# Patient Record
Sex: FEMALE | Race: WHITE | NOT HISPANIC OR LATINO | URBAN - NONMETROPOLITAN AREA
[De-identification: names, ages, dates, MRNs, and addresses within clinical notes are randomized per-mention and may not be internally consistent; named-entity substitution may affect disease eponyms.]

---

## 2017-11-16 ENCOUNTER — APPOINTMENT (RX ONLY)
Dept: URBAN - NONMETROPOLITAN AREA CLINIC 2 | Facility: CLINIC | Age: 47
Setting detail: DERMATOLOGY
End: 2017-11-16

## 2017-11-16 DIAGNOSIS — L29.89 OTHER PRURITUS: ICD-10-CM

## 2017-11-16 DIAGNOSIS — L28.1 PRURIGO NODULARIS: ICD-10-CM

## 2017-11-16 DIAGNOSIS — D18.0 HEMANGIOMA: ICD-10-CM

## 2017-11-16 DIAGNOSIS — L259 CONTACT DERMATITIS AND OTHER ECZEMA, UNSPECIFIED CAUSE: ICD-10-CM

## 2017-11-16 DIAGNOSIS — L29.8 OTHER PRURITUS: ICD-10-CM

## 2017-11-16 DIAGNOSIS — Z87.891 PERSONAL HISTORY OF NICOTINE DEPENDENCE: ICD-10-CM

## 2017-11-16 PROBLEM — D18.01 HEMANGIOMA OF SKIN AND SUBCUTANEOUS TISSUE: Status: ACTIVE | Noted: 2017-11-16

## 2017-11-16 PROBLEM — F32.9 MAJOR DEPRESSIVE DISORDER, SINGLE EPISODE, UNSPECIFIED: Status: ACTIVE | Noted: 2017-11-16

## 2017-11-16 PROBLEM — L85.3 XEROSIS CUTIS: Status: ACTIVE | Noted: 2017-11-16

## 2017-11-16 PROBLEM — F41.9 ANXIETY DISORDER, UNSPECIFIED: Status: ACTIVE | Noted: 2017-11-16

## 2017-11-16 PROBLEM — L30.8 OTHER SPECIFIED DERMATITIS: Status: ACTIVE | Noted: 2017-11-16

## 2017-11-16 PROCEDURE — ? TREATMENT REGIMEN

## 2017-11-16 PROCEDURE — ? OBSERVATION

## 2017-11-16 PROCEDURE — ? OTHER

## 2017-11-16 PROCEDURE — ? COUNSELING

## 2017-11-16 PROCEDURE — 99203 OFFICE O/P NEW LOW 30 MIN: CPT

## 2017-11-16 ASSESSMENT — LOCATION DETAILED DESCRIPTION DERM
LOCATION DETAILED: RIGHT MEDIAL FRONTAL SCALP
LOCATION DETAILED: RIGHT VENTRAL PROXIMAL FOREARM
LOCATION DETAILED: RIGHT PROXIMAL PRETIBIAL REGION
LOCATION DETAILED: RIGHT CENTRAL FRONTAL SCALP
LOCATION DETAILED: 3RD WEB SPACE RIGHT HAND
LOCATION DETAILED: LEFT PROXIMAL DORSAL FOREARM
LOCATION DETAILED: RIGHT PROXIMAL DORSAL FOREARM
LOCATION DETAILED: SUPERIOR THORACIC SPINE
LOCATION DETAILED: LEFT ANTECUBITAL SKIN
LOCATION DETAILED: LEFT RADIAL DORSAL HAND
LOCATION DETAILED: LEFT DISTAL PRETIBIAL REGION
LOCATION DETAILED: LEFT VENTRAL PROXIMAL FOREARM
LOCATION DETAILED: RIGHT ANTERIOR DISTAL UPPER ARM

## 2017-11-16 ASSESSMENT — LOCATION ZONE DERM
LOCATION ZONE: SCALP
LOCATION ZONE: LEG
LOCATION ZONE: ARM
LOCATION ZONE: HAND
LOCATION ZONE: TRUNK

## 2017-11-16 ASSESSMENT — LOCATION SIMPLE DESCRIPTION DERM
LOCATION SIMPLE: RIGHT SCALP
LOCATION SIMPLE: SCALP
LOCATION SIMPLE: LEFT UPPER ARM
LOCATION SIMPLE: LEFT PRETIBIAL REGION
LOCATION SIMPLE: RIGHT UPPER ARM
LOCATION SIMPLE: LEFT HAND
LOCATION SIMPLE: RIGHT HAND
LOCATION SIMPLE: LEFT FOREARM
LOCATION SIMPLE: RIGHT PRETIBIAL REGION
LOCATION SIMPLE: UPPER BACK
LOCATION SIMPLE: RIGHT FOREARM

## 2017-11-16 ASSESSMENT — SEVERITY ASSESSMENT: SEVERITY: MILD TO MODERATE

## 2017-11-16 NOTE — PROCEDURE: OBSERVATION
X Size Of Lesion In Cm (Optional): 0
Detail Level: Simple
Body Location Override (Optional - Billing Will Still Be Based On Selected Body Map Location If Applicable): back
Detail Level: Zone
Body Location Override (Optional - Billing Will Still Be Based On Selected Body Map Location If Applicable): left Upper Extremity
Body Location Override (Optional - Billing Will Still Be Based On Selected Body Map Location If Applicable): right Upper Extremity
Body Location Override (Optional - Billing Will Still Be Based On Selected Body Map Location If Applicable): right Lower Leg
Body Location Override (Optional - Billing Will Still Be Based On Selected Body Map Location If Applicable): left lower leg
Body Location Override (Optional - Billing Will Still Be Based On Selected Body Map Location If Applicable): Scalp
Detail Level: Detailed
Body Location Override (Optional - Billing Will Still Be Based On Selected Body Map Location If Applicable): right arm
Body Location Override (Optional - Billing Will Still Be Based On Selected Body Map Location If Applicable): left arm

## 2017-11-16 NOTE — PROCEDURE: OTHER
Detail Level: Zone
Other (Free Text): Rash mild at today's visit. Discussed this is in the same category as eczema
Note Text (......Xxx Chief Complaint.): This diagnosis correlates with the
Other (Free Text): Patient has several stress factors that could play a big role in flares. \\nRecent car accident 3 days ago\\nBack pain \\nRecommended allergist for additional testing but could result in unclear results. Patient to d/c all antihistamines before seeing allergist\\n[facility], Dr Reynoso is Patients psychiatrist, discussed Doxepin. Patient will go over with Dr Reynoso about recommendation. \\nPatient is taking Depakote for bipolar, anxiety, depression and for sleep aid. Discussed this can cause itchy flares

## 2017-11-16 NOTE — PROCEDURE: TREATMENT REGIMEN
Otc Regimen: Sarna Lotion
Detail Level: Zone
Plan: Keeping skin hydrated throughout the day. \\nAfter showering pat dry and applying moisturizer while skin is still damp
Continue Regimen: Allegra QAM \\nBenadryl QHS

## 2020-10-07 ENCOUNTER — APPOINTMENT (RX ONLY)
Dept: URBAN - NONMETROPOLITAN AREA CLINIC 10 | Facility: CLINIC | Age: 50
Setting detail: DERMATOLOGY
End: 2020-10-07

## 2020-10-07 DIAGNOSIS — Q819 OTHER SPECIFIED ANOMALIES OF SKIN: ICD-10-CM

## 2020-10-07 DIAGNOSIS — Q828 OTHER SPECIFIED ANOMALIES OF SKIN: ICD-10-CM

## 2020-10-07 DIAGNOSIS — L20.89 OTHER ATOPIC DERMATITIS: ICD-10-CM

## 2020-10-07 DIAGNOSIS — Q826 OTHER SPECIFIED ANOMALIES OF SKIN: ICD-10-CM

## 2020-10-07 PROBLEM — L85.8 OTHER SPECIFIED EPIDERMAL THICKENING: Status: ACTIVE | Noted: 2020-10-07

## 2020-10-07 PROCEDURE — ? INTRAMUSCULAR KENALOG

## 2020-10-07 PROCEDURE — 96372 THER/PROPH/DIAG INJ SC/IM: CPT

## 2020-10-07 PROCEDURE — ? PRESCRIPTION

## 2020-10-07 PROCEDURE — ? COUNSELING

## 2020-10-07 PROCEDURE — 99214 OFFICE O/P EST MOD 30 MIN: CPT | Mod: 25

## 2020-10-07 PROCEDURE — ? TREATMENT REGIMEN

## 2020-10-07 RX ORDER — TRIAMCINOLONE ACETONIDE 1 MG/G
1 OINTMENT TOPICAL TWICE DAILY
Qty: 1 | Refills: 1 | Status: ERX | COMMUNITY
Start: 2020-10-07

## 2020-10-07 RX ADMIN — TRIAMCINOLONE ACETONIDE 1: 1 OINTMENT TOPICAL at 00:00

## 2020-10-07 ASSESSMENT — LOCATION SIMPLE DESCRIPTION DERM
LOCATION SIMPLE: LEFT POSTERIOR UPPER ARM
LOCATION SIMPLE: CHEST
LOCATION SIMPLE: RIGHT BUTTOCK
LOCATION SIMPLE: LEFT PRETIBIAL REGION
LOCATION SIMPLE: RIGHT PRETIBIAL REGION
LOCATION SIMPLE: LEFT UPPER BACK
LOCATION SIMPLE: LEFT UPPER ARM
LOCATION SIMPLE: RIGHT POSTERIOR UPPER ARM
LOCATION SIMPLE: RIGHT UPPER BACK
LOCATION SIMPLE: RIGHT UPPER ARM

## 2020-10-07 ASSESSMENT — LOCATION DETAILED DESCRIPTION DERM
LOCATION DETAILED: RIGHT PROXIMAL POSTERIOR UPPER ARM
LOCATION DETAILED: RIGHT BUTTOCK
LOCATION DETAILED: RIGHT ANTERIOR DISTAL UPPER ARM
LOCATION DETAILED: RIGHT MEDIAL SUPERIOR CHEST
LOCATION DETAILED: RIGHT INFERIOR UPPER BACK
LOCATION DETAILED: RIGHT PROXIMAL PRETIBIAL REGION
LOCATION DETAILED: LEFT SUPERIOR MEDIAL UPPER BACK
LOCATION DETAILED: LEFT PROXIMAL POSTERIOR UPPER ARM
LOCATION DETAILED: LEFT ANTERIOR PROXIMAL UPPER ARM
LOCATION DETAILED: LEFT PROXIMAL PRETIBIAL REGION

## 2020-10-07 ASSESSMENT — LOCATION ZONE DERM
LOCATION ZONE: LEG
LOCATION ZONE: TRUNK
LOCATION ZONE: ARM

## 2020-10-07 NOTE — PROCEDURE: INTRAMUSCULAR KENALOG
Detail Level: Detailed
Administered By (Optional): Consuelo
Consent: The risks of atrophy were reviewed with the patient.
Total Volume (Ccs): 1.5
Kenalog Preparation: kenalog
Lot # (Optional): [id number]
Concentration (Mg/Ml): 40.0
Expiration Date (Optional): 4/21
Add Option For Additional Mediation: No
Concentration (Mg/Ml) Of Additional Medication: 2.5

## 2020-11-04 ENCOUNTER — RX ONLY (OUTPATIENT)
Age: 50
Setting detail: RX ONLY
End: 2020-11-04

## 2020-11-04 RX ORDER — DUPILUMAB 300 MG/2ML
INJECTION, SOLUTION SUBCUTANEOUS
Qty: 2 | Refills: 0 | Status: ERX | COMMUNITY
Start: 2020-11-04

## 2020-11-09 ENCOUNTER — RX ONLY (OUTPATIENT)
Age: 50
Setting detail: RX ONLY
End: 2020-11-09

## 2020-11-09 RX ORDER — DUPILUMAB 300 MG/2ML
INJECTION, SOLUTION SUBCUTANEOUS
Qty: 2 | Refills: 0 | Status: ERX

## 2020-11-13 ENCOUNTER — RX ONLY (OUTPATIENT)
Age: 50
Setting detail: RX ONLY
End: 2020-11-13

## 2020-11-13 RX ORDER — DUPILUMAB 300 MG/2ML
INJECTION, SOLUTION SUBCUTANEOUS
Qty: 2 | Refills: 5 | Status: ERX

## 2020-11-13 RX ORDER — DUPILUMAB 300 MG/2ML
INJECTION, SOLUTION SUBCUTANEOUS
Qty: 2 | Refills: 0 | Status: ERX

## 2020-12-07 ENCOUNTER — APPOINTMENT (RX ONLY)
Dept: URBAN - NONMETROPOLITAN AREA CLINIC 2 | Facility: CLINIC | Age: 50
Setting detail: DERMATOLOGY
End: 2020-12-07

## 2020-12-07 DIAGNOSIS — L20.89 OTHER ATOPIC DERMATITIS: ICD-10-CM

## 2020-12-07 PROCEDURE — ? DUPIXENT INJECTION

## 2020-12-07 PROCEDURE — 96372 THER/PROPH/DIAG INJ SC/IM: CPT

## 2020-12-07 ASSESSMENT — LOCATION ZONE DERM
LOCATION ZONE: TRUNK
LOCATION ZONE: LEG

## 2020-12-07 ASSESSMENT — LOCATION SIMPLE DESCRIPTION DERM
LOCATION SIMPLE: RIGHT THIGH
LOCATION SIMPLE: ABDOMEN

## 2020-12-07 ASSESSMENT — LOCATION DETAILED DESCRIPTION DERM
LOCATION DETAILED: PERIUMBILICAL SKIN
LOCATION DETAILED: RIGHT ANTERIOR PROXIMAL THIGH

## 2020-12-07 NOTE — PROCEDURE: DUPIXENT INJECTION
Detail Level: None
Dupixent Dosing: 300 mg
Syringe Size Used (Required For Enhanced Ndc): 300 mg/2ml prefilled syringe
Lot # (Optional): [id number]
Expiration Date (Optional): 2/2022
Administered By (Optional): Lo ALVAREZ
Consent: The risks of pain and injection site reactions were reviewed with the patient prior to the injection.
Include J-Code In Bill: No
J-Code: 
Use Enhanced Ndc?: Yes
Ndc (300 Mg Prefilled Syringe): [address]-27
Ndc (200 Mg Prefilled Syringe): [address]-80
Expiration Date (Optional): 01/2023
Lot # (Optional): [id number]

## 2020-12-09 ENCOUNTER — APPOINTMENT (RX ONLY)
Dept: URBAN - NONMETROPOLITAN AREA CLINIC 2 | Facility: CLINIC | Age: 50
Setting detail: DERMATOLOGY
End: 2020-12-09

## 2020-12-09 DIAGNOSIS — B00.1 HERPESVIRAL VESICULAR DERMATITIS: ICD-10-CM

## 2020-12-09 DIAGNOSIS — L20.89 OTHER ATOPIC DERMATITIS: ICD-10-CM

## 2020-12-09 PROCEDURE — 99213 OFFICE O/P EST LOW 20 MIN: CPT

## 2020-12-09 PROCEDURE — ? PRESCRIPTION

## 2020-12-09 PROCEDURE — ? TREATMENT REGIMEN

## 2020-12-09 PROCEDURE — ? COUNSELING

## 2020-12-09 RX ORDER — TRIAMCINOLONE ACETONIDE 1 MG/G
1 OINTMENT TOPICAL BID
Qty: 1 | Refills: 1 | Status: ERX

## 2020-12-09 RX ORDER — VALACYCLOVIR 1 G/1
2 TABLET, FILM COATED ORAL Q12 HOURS
Qty: 12 | Refills: 1 | Status: ERX | COMMUNITY
Start: 2020-12-09

## 2020-12-09 RX ORDER — HYDROXYZINE HYDROCHLORIDE 10 MG/1
1-3 TABLET, FILM COATED ORAL QHS
Qty: 90 | Refills: 1 | Status: ERX | COMMUNITY
Start: 2020-12-09

## 2020-12-09 RX ORDER — DUPILUMAB 300 MG/2ML
1 INJECTION, SOLUTION SUBCUTANEOUS
Qty: 1 | Refills: 2 | Status: ERX

## 2020-12-09 RX ADMIN — VALACYCLOVIR 2: 1 TABLET, FILM COATED ORAL at 00:00

## 2020-12-09 RX ADMIN — HYDROXYZINE HYDROCHLORIDE 1-3: 10 TABLET, FILM COATED ORAL at 00:00

## 2020-12-09 ASSESSMENT — LOCATION DETAILED DESCRIPTION DERM
LOCATION DETAILED: RIGHT ANTERIOR PROXIMAL THIGH
LOCATION DETAILED: RIGHT DISTAL POSTERIOR THIGH
LOCATION DETAILED: UPPER STERNUM
LOCATION DETAILED: RIGHT INFERIOR VERMILION LIP
LOCATION DETAILED: LEFT SUPERIOR VERMILION LIP
LOCATION DETAILED: RIGHT VENTRAL PROXIMAL FOREARM
LOCATION DETAILED: LEFT ANTERIOR PROXIMAL UPPER ARM
LOCATION DETAILED: RIGHT PROXIMAL POSTERIOR UPPER ARM
LOCATION DETAILED: LEFT VENTRAL PROXIMAL FOREARM
LOCATION DETAILED: LEFT ANTERIOR PROXIMAL THIGH
LOCATION DETAILED: SUPERIOR THORACIC SPINE
LOCATION DETAILED: LEFT PROXIMAL DORSAL FOREARM
LOCATION DETAILED: RIGHT ANTERIOR PROXIMAL UPPER ARM
LOCATION DETAILED: LEFT DISTAL POSTERIOR THIGH
LOCATION DETAILED: LEFT PROXIMAL POSTERIOR UPPER ARM
LOCATION DETAILED: PERIUMBILICAL SKIN
LOCATION DETAILED: RIGHT PROXIMAL DORSAL FOREARM

## 2020-12-09 ASSESSMENT — LOCATION SIMPLE DESCRIPTION DERM
LOCATION SIMPLE: ABDOMEN
LOCATION SIMPLE: RIGHT POSTERIOR UPPER ARM
LOCATION SIMPLE: LEFT POSTERIOR THIGH
LOCATION SIMPLE: LEFT LIP
LOCATION SIMPLE: LEFT UPPER ARM
LOCATION SIMPLE: LEFT THIGH
LOCATION SIMPLE: LEFT POSTERIOR UPPER ARM
LOCATION SIMPLE: RIGHT POSTERIOR THIGH
LOCATION SIMPLE: CHEST
LOCATION SIMPLE: UPPER BACK
LOCATION SIMPLE: RIGHT UPPER ARM
LOCATION SIMPLE: LEFT FOREARM
LOCATION SIMPLE: RIGHT FOREARM
LOCATION SIMPLE: RIGHT THIGH
LOCATION SIMPLE: RIGHT LIP

## 2020-12-09 ASSESSMENT — LOCATION ZONE DERM
LOCATION ZONE: ARM
LOCATION ZONE: TRUNK
LOCATION ZONE: LEG
LOCATION ZONE: LIP

## 2020-12-09 NOTE — PROCEDURE: COUNSELING
Patient Specific Counseling (Will Not Stick From Patient To Patient): Use hydroxyzine in place of trazadone at bedtime if possible to avoid enhanced sedation effects
Detail Level: Zone

## 2020-12-09 NOTE — PROCEDURE: TREATMENT REGIMEN
Initiate Treatment: Hydroxyzine 10mg (1-3) QHS
Samples Given: CeraVe Moisturizing Cream
Plan: Discussed with patient returning to clinic for our office to administer injections going forward if patient does not want to self inject. Patient had injection on hand and will self inject on 12/21. \\nRecommended cooling showers down to help reduce further irritation from hot water, apply a moisturizing cream (CeraVe) within 60 seconds of drying off to prevent dryness
Detail Level: Zone
Continue Regimen: Dupixent 300mg Q2 weeks\\nTriamcinolone 0.1% Ointment applying to affected areas on the body twice daily for up to two weeks then as needed when flare ups occur

## 2020-12-31 ENCOUNTER — APPOINTMENT (OUTPATIENT)
Dept: CT IMAGING | Age: 50
DRG: 558 | End: 2020-12-31
Payer: COMMERCIAL

## 2020-12-31 ENCOUNTER — APPOINTMENT (OUTPATIENT)
Dept: GENERAL RADIOLOGY | Age: 50
DRG: 558 | End: 2020-12-31
Payer: COMMERCIAL

## 2020-12-31 ENCOUNTER — APPOINTMENT (OUTPATIENT)
Dept: ULTRASOUND IMAGING | Age: 50
DRG: 558 | End: 2020-12-31
Payer: COMMERCIAL

## 2020-12-31 ENCOUNTER — HOSPITAL ENCOUNTER (INPATIENT)
Age: 50
LOS: 8 days | Discharge: NURSING, REHAB OR LONG-TERM CARE | DRG: 558 | End: 2021-01-08
Attending: EMERGENCY MEDICINE | Admitting: INTERNAL MEDICINE
Payer: COMMERCIAL

## 2020-12-31 DIAGNOSIS — M62.82 NON-TRAUMATIC RHABDOMYOLYSIS: Primary | ICD-10-CM

## 2020-12-31 LAB
AMPHETAMINE SCREEN, URINE: NOT DETECTED
ANION GAP SERPL CALCULATED.3IONS-SCNC: 9 MMOL/L (ref 7–16)
BARBITURATE SCREEN URINE: NOT DETECTED
BASOPHILS ABSOLUTE: 0.08 E9/L (ref 0–0.2)
BASOPHILS RELATIVE PERCENT: 0.6 % (ref 0–2)
BENZODIAZEPINE SCREEN, URINE: NOT DETECTED
BUN BLDV-MCNC: 16 MG/DL (ref 6–20)
CALCIUM SERPL-MCNC: 8.7 MG/DL (ref 8.6–10.2)
CANNABINOID SCREEN URINE: NOT DETECTED
CHLORIDE BLD-SCNC: 104 MMOL/L (ref 98–107)
CO2: 22 MMOL/L (ref 22–29)
COCAINE METABOLITE SCREEN URINE: NOT DETECTED
CREAT SERPL-MCNC: 1 MG/DL (ref 0.5–1)
D DIMER: 308 NG/ML DDU
EOSINOPHILS ABSOLUTE: 0.37 E9/L (ref 0.05–0.5)
EOSINOPHILS RELATIVE PERCENT: 2.6 % (ref 0–6)
FENTANYL SCREEN, URINE: NOT DETECTED
GFR AFRICAN AMERICAN: >60
GFR NON-AFRICAN AMERICAN: 58 ML/MIN/1.73
GLUCOSE BLD-MCNC: 128 MG/DL (ref 74–99)
HBA1C MFR BLD: 5.7 % (ref 4–5.6)
HCT VFR BLD CALC: 48.3 % (ref 34–48)
HEMOGLOBIN: 16.7 G/DL (ref 11.5–15.5)
IMMATURE GRANULOCYTES #: 0.09 E9/L
IMMATURE GRANULOCYTES %: 0.6 % (ref 0–5)
LYMPHOCYTES ABSOLUTE: 2.32 E9/L (ref 1.5–4)
LYMPHOCYTES RELATIVE PERCENT: 16.6 % (ref 20–42)
Lab: NORMAL
MAGNESIUM: 2.2 MG/DL (ref 1.6–2.6)
MCH RBC QN AUTO: 30.1 PG (ref 26–35)
MCHC RBC AUTO-ENTMCNC: 34.6 % (ref 32–34.5)
MCV RBC AUTO: 87 FL (ref 80–99.9)
METHADONE SCREEN, URINE: NOT DETECTED
MONOCYTES ABSOLUTE: 1.19 E9/L (ref 0.1–0.95)
MONOCYTES RELATIVE PERCENT: 8.5 % (ref 2–12)
NEUTROPHILS ABSOLUTE: 9.96 E9/L (ref 1.8–7.3)
NEUTROPHILS RELATIVE PERCENT: 71.1 % (ref 43–80)
OPIATE SCREEN URINE: NOT DETECTED
OXYCODONE URINE: NOT DETECTED
PDW BLD-RTO: 13.5 FL (ref 11.5–15)
PHENCYCLIDINE SCREEN URINE: NOT DETECTED
PLATELET # BLD: 219 E9/L (ref 130–450)
PMV BLD AUTO: 11.2 FL (ref 7–12)
POTASSIUM SERPL-SCNC: 3.5 MMOL/L (ref 3.5–5)
PRO-BNP: 11 PG/ML (ref 0–125)
RBC # BLD: 5.55 E12/L (ref 3.5–5.5)
SODIUM BLD-SCNC: 135 MMOL/L (ref 132–146)
TOTAL CK: 1406 U/L (ref 20–180)
TROPONIN: <0.01 NG/ML (ref 0–0.03)
WBC # BLD: 14 E9/L (ref 4.5–11.5)

## 2020-12-31 PROCEDURE — 2580000003 HC RX 258: Performed by: EMERGENCY MEDICINE

## 2020-12-31 PROCEDURE — 94640 AIRWAY INHALATION TREATMENT: CPT

## 2020-12-31 PROCEDURE — 83036 HEMOGLOBIN GLYCOSYLATED A1C: CPT

## 2020-12-31 PROCEDURE — 6360000002 HC RX W HCPCS: Performed by: NURSE PRACTITIONER

## 2020-12-31 PROCEDURE — 84484 ASSAY OF TROPONIN QUANT: CPT

## 2020-12-31 PROCEDURE — 1200000000 HC SEMI PRIVATE

## 2020-12-31 PROCEDURE — 93970 EXTREMITY STUDY: CPT

## 2020-12-31 PROCEDURE — 85025 COMPLETE CBC W/AUTO DIFF WBC: CPT

## 2020-12-31 PROCEDURE — 83735 ASSAY OF MAGNESIUM: CPT

## 2020-12-31 PROCEDURE — 96374 THER/PROPH/DIAG INJ IV PUSH: CPT

## 2020-12-31 PROCEDURE — 6360000004 HC RX CONTRAST MEDICATION: Performed by: RADIOLOGY

## 2020-12-31 PROCEDURE — 99222 1ST HOSP IP/OBS MODERATE 55: CPT | Performed by: INTERNAL MEDICINE

## 2020-12-31 PROCEDURE — 6370000000 HC RX 637 (ALT 250 FOR IP): Performed by: NURSE PRACTITIONER

## 2020-12-31 PROCEDURE — 82550 ASSAY OF CK (CPK): CPT

## 2020-12-31 PROCEDURE — 6370000000 HC RX 637 (ALT 250 FOR IP): Performed by: EMERGENCY MEDICINE

## 2020-12-31 PROCEDURE — 97161 PT EVAL LOW COMPLEX 20 MIN: CPT

## 2020-12-31 PROCEDURE — 71275 CT ANGIOGRAPHY CHEST: CPT

## 2020-12-31 PROCEDURE — 99285 EMERGENCY DEPT VISIT HI MDM: CPT

## 2020-12-31 PROCEDURE — 6360000002 HC RX W HCPCS: Performed by: STUDENT IN AN ORGANIZED HEALTH CARE EDUCATION/TRAINING PROGRAM

## 2020-12-31 PROCEDURE — 93005 ELECTROCARDIOGRAM TRACING: CPT | Performed by: NURSE PRACTITIONER

## 2020-12-31 PROCEDURE — 80048 BASIC METABOLIC PNL TOTAL CA: CPT

## 2020-12-31 PROCEDURE — 94664 DEMO&/EVAL PT USE INHALER: CPT

## 2020-12-31 PROCEDURE — 80307 DRUG TEST PRSMV CHEM ANLYZR: CPT

## 2020-12-31 PROCEDURE — 71045 X-RAY EXAM CHEST 1 VIEW: CPT

## 2020-12-31 PROCEDURE — 83880 ASSAY OF NATRIURETIC PEPTIDE: CPT

## 2020-12-31 PROCEDURE — 85378 FIBRIN DEGRADE SEMIQUANT: CPT

## 2020-12-31 PROCEDURE — 2580000003 HC RX 258: Performed by: NURSE PRACTITIONER

## 2020-12-31 RX ORDER — BUDESONIDE 0.5 MG/2ML
0.5 INHALANT ORAL 2 TIMES DAILY
Status: DISCONTINUED | OUTPATIENT
Start: 2020-12-31 | End: 2020-12-31

## 2020-12-31 RX ORDER — ACETAMINOPHEN 500 MG
1000 TABLET ORAL ONCE
Status: DISCONTINUED | OUTPATIENT
Start: 2020-12-31 | End: 2020-12-31

## 2020-12-31 RX ORDER — HYDROXYZINE HYDROCHLORIDE 10 MG/1
10 TABLET, FILM COATED ORAL NIGHTLY PRN
COMMUNITY

## 2020-12-31 RX ORDER — BUDESONIDE 0.5 MG/2ML
0.5 INHALANT ORAL 2 TIMES DAILY
Status: DISCONTINUED | OUTPATIENT
Start: 2020-12-31 | End: 2021-01-08 | Stop reason: HOSPADM

## 2020-12-31 RX ORDER — SODIUM CHLORIDE 0.9 % (FLUSH) 0.9 %
10 SYRINGE (ML) INJECTION PRN
Status: DISCONTINUED | OUTPATIENT
Start: 2020-12-31 | End: 2021-01-08 | Stop reason: HOSPADM

## 2020-12-31 RX ORDER — SODIUM CHLORIDE AND POTASSIUM CHLORIDE .9; .15 G/100ML; G/100ML
SOLUTION INTRAVENOUS CONTINUOUS
Status: DISCONTINUED | OUTPATIENT
Start: 2020-12-31 | End: 2021-01-07

## 2020-12-31 RX ORDER — TRAZODONE HYDROCHLORIDE 50 MG/1
50 TABLET ORAL NIGHTLY
COMMUNITY

## 2020-12-31 RX ORDER — ESCITALOPRAM OXALATE 20 MG/1
20 TABLET ORAL DAILY
Status: ON HOLD | COMMUNITY
End: 2020-12-31

## 2020-12-31 RX ORDER — ONDANSETRON 2 MG/ML
4 INJECTION INTRAMUSCULAR; INTRAVENOUS ONCE
Status: COMPLETED | OUTPATIENT
Start: 2020-12-31 | End: 2020-12-31

## 2020-12-31 RX ORDER — BUDESONIDE AND FORMOTEROL FUMARATE DIHYDRATE 160; 4.5 UG/1; UG/1
2 AEROSOL RESPIRATORY (INHALATION) 2 TIMES DAILY
COMMUNITY

## 2020-12-31 RX ORDER — ALBUTEROL SULFATE 0.63 MG/3ML
1 SOLUTION RESPIRATORY (INHALATION) EVERY 6 HOURS PRN
COMMUNITY

## 2020-12-31 RX ORDER — ACETAMINOPHEN 325 MG/1
650 TABLET ORAL EVERY 6 HOURS PRN
Status: DISCONTINUED | OUTPATIENT
Start: 2020-12-31 | End: 2021-01-08 | Stop reason: HOSPADM

## 2020-12-31 RX ORDER — PANTOPRAZOLE SODIUM 40 MG/1
40 TABLET, DELAYED RELEASE ORAL
Status: DISCONTINUED | OUTPATIENT
Start: 2021-01-01 | End: 2021-01-08 | Stop reason: HOSPADM

## 2020-12-31 RX ORDER — TRAMADOL HYDROCHLORIDE 50 MG/1
25 TABLET ORAL EVERY 6 HOURS PRN
Status: DISCONTINUED | OUTPATIENT
Start: 2020-12-31 | End: 2020-12-31

## 2020-12-31 RX ORDER — OLANZAPINE 5 MG/1
5 TABLET ORAL NIGHTLY
Status: ON HOLD | COMMUNITY
End: 2021-01-08 | Stop reason: HOSPADM

## 2020-12-31 RX ORDER — ALBUTEROL SULFATE 0.63 MG/3ML
1 SOLUTION RESPIRATORY (INHALATION) EVERY 6 HOURS PRN
Status: DISCONTINUED | OUTPATIENT
Start: 2020-12-31 | End: 2021-01-08 | Stop reason: HOSPADM

## 2020-12-31 RX ORDER — SODIUM CHLORIDE 9 MG/ML
INJECTION, SOLUTION INTRAVENOUS CONTINUOUS
Status: DISCONTINUED | OUTPATIENT
Start: 2020-12-31 | End: 2020-12-31

## 2020-12-31 RX ORDER — ARFORMOTEROL TARTRATE 15 UG/2ML
15 SOLUTION RESPIRATORY (INHALATION) 2 TIMES DAILY
Status: DISCONTINUED | OUTPATIENT
Start: 2020-12-31 | End: 2020-12-31

## 2020-12-31 RX ORDER — ACETAMINOPHEN 650 MG/1
650 SUPPOSITORY RECTAL EVERY 6 HOURS PRN
Status: DISCONTINUED | OUTPATIENT
Start: 2020-12-31 | End: 2021-01-08 | Stop reason: HOSPADM

## 2020-12-31 RX ORDER — SODIUM CHLORIDE 0.9 % (FLUSH) 0.9 %
10 SYRINGE (ML) INJECTION EVERY 12 HOURS SCHEDULED
Status: DISCONTINUED | OUTPATIENT
Start: 2020-12-31 | End: 2021-01-08 | Stop reason: HOSPADM

## 2020-12-31 RX ORDER — OXYCODONE HYDROCHLORIDE AND ACETAMINOPHEN 5; 325 MG/1; MG/1
1 TABLET ORAL EVERY 6 HOURS PRN
Status: DISCONTINUED | OUTPATIENT
Start: 2020-12-31 | End: 2021-01-01

## 2020-12-31 RX ORDER — POLYETHYLENE GLYCOL 3350 17 G/17G
17 POWDER, FOR SOLUTION ORAL DAILY PRN
Status: DISCONTINUED | OUTPATIENT
Start: 2020-12-31 | End: 2021-01-08 | Stop reason: HOSPADM

## 2020-12-31 RX ORDER — BUDESONIDE AND FORMOTEROL FUMARATE DIHYDRATE 160; 4.5 UG/1; UG/1
2 AEROSOL RESPIRATORY (INHALATION) 2 TIMES DAILY
Status: DISCONTINUED | OUTPATIENT
Start: 2020-12-31 | End: 2020-12-31 | Stop reason: CLARIF

## 2020-12-31 RX ORDER — ARFORMOTEROL TARTRATE 15 UG/2ML
15 SOLUTION RESPIRATORY (INHALATION) 2 TIMES DAILY
Status: DISCONTINUED | OUTPATIENT
Start: 2020-12-31 | End: 2021-01-08 | Stop reason: HOSPADM

## 2020-12-31 RX ORDER — LORAZEPAM 2 MG/ML
1 INJECTION INTRAMUSCULAR ONCE
Status: COMPLETED | OUTPATIENT
Start: 2020-12-31 | End: 2020-12-31

## 2020-12-31 RX ORDER — BUDESONIDE AND FORMOTEROL FUMARATE DIHYDRATE 160; 4.5 UG/1; UG/1
2 AEROSOL RESPIRATORY (INHALATION) 2 TIMES DAILY
Status: DISCONTINUED | OUTPATIENT
Start: 2020-12-31 | End: 2020-12-31 | Stop reason: ALTCHOICE

## 2020-12-31 RX ORDER — ESCITALOPRAM OXALATE 10 MG/1
20 TABLET ORAL DAILY
Status: DISCONTINUED | OUTPATIENT
Start: 2020-12-31 | End: 2020-12-31

## 2020-12-31 RX ORDER — MORPHINE SULFATE 2 MG/ML
2 INJECTION, SOLUTION INTRAMUSCULAR; INTRAVENOUS
Status: DISCONTINUED | OUTPATIENT
Start: 2020-12-31 | End: 2021-01-08 | Stop reason: HOSPADM

## 2020-12-31 RX ORDER — BUSPIRONE HYDROCHLORIDE 10 MG/1
10 TABLET ORAL DAILY PRN
COMMUNITY

## 2020-12-31 RX ORDER — PROMETHAZINE HYDROCHLORIDE 25 MG/1
12.5 TABLET ORAL EVERY 6 HOURS PRN
Status: DISCONTINUED | OUTPATIENT
Start: 2020-12-31 | End: 2021-01-08 | Stop reason: HOSPADM

## 2020-12-31 RX ORDER — ACETAMINOPHEN 500 MG
1000 TABLET ORAL ONCE
Status: COMPLETED | OUTPATIENT
Start: 2020-12-31 | End: 2020-12-31

## 2020-12-31 RX ORDER — ONDANSETRON 2 MG/ML
4 INJECTION INTRAMUSCULAR; INTRAVENOUS EVERY 6 HOURS PRN
Status: DISCONTINUED | OUTPATIENT
Start: 2020-12-31 | End: 2021-01-08 | Stop reason: HOSPADM

## 2020-12-31 RX ORDER — 0.9 % SODIUM CHLORIDE 0.9 %
500 INTRAVENOUS SOLUTION INTRAVENOUS ONCE
Status: COMPLETED | OUTPATIENT
Start: 2020-12-31 | End: 2020-12-31

## 2020-12-31 RX ADMIN — ACETAMINOPHEN 1000 MG: 500 TABLET ORAL at 08:01

## 2020-12-31 RX ADMIN — TRAMADOL HYDROCHLORIDE 25 MG: 50 TABLET, FILM COATED ORAL at 15:40

## 2020-12-31 RX ADMIN — POTASSIUM CHLORIDE AND SODIUM CHLORIDE: 900; 150 INJECTION, SOLUTION INTRAVENOUS at 20:50

## 2020-12-31 RX ADMIN — ONDANSETRON 4 MG: 2 INJECTION INTRAMUSCULAR; INTRAVENOUS at 14:01

## 2020-12-31 RX ADMIN — SODIUM CHLORIDE, PRESERVATIVE FREE 10 ML: 5 INJECTION INTRAVENOUS at 21:42

## 2020-12-31 RX ADMIN — LORAZEPAM 1 MG: 2 INJECTION INTRAMUSCULAR; INTRAVENOUS at 16:01

## 2020-12-31 RX ADMIN — MORPHINE SULFATE 2 MG: 2 INJECTION, SOLUTION INTRAMUSCULAR; INTRAVENOUS at 17:26

## 2020-12-31 RX ADMIN — MORPHINE SULFATE 2 MG: 2 INJECTION, SOLUTION INTRAMUSCULAR; INTRAVENOUS at 21:34

## 2020-12-31 RX ADMIN — ENOXAPARIN SODIUM 40 MG: 40 INJECTION SUBCUTANEOUS at 14:01

## 2020-12-31 RX ADMIN — IOPAMIDOL 75 ML: 755 INJECTION, SOLUTION INTRAVENOUS at 16:20

## 2020-12-31 RX ADMIN — ONDANSETRON 4 MG: 2 INJECTION INTRAMUSCULAR; INTRAVENOUS at 06:21

## 2020-12-31 RX ADMIN — POTASSIUM CHLORIDE AND SODIUM CHLORIDE: 900; 150 INJECTION, SOLUTION INTRAVENOUS at 12:49

## 2020-12-31 RX ADMIN — MORPHINE SULFATE 2 MG: 2 INJECTION, SOLUTION INTRAMUSCULAR; INTRAVENOUS at 13:56

## 2020-12-31 RX ADMIN — OXYCODONE AND ACETAMINOPHEN 1 TABLET: 5; 325 TABLET ORAL at 20:19

## 2020-12-31 RX ADMIN — BUDESONIDE 500 MCG: 0.5 SUSPENSION RESPIRATORY (INHALATION) at 19:29

## 2020-12-31 RX ADMIN — ARFORMOTEROL TARTRATE 15 MCG: 15 SOLUTION RESPIRATORY (INHALATION) at 19:28

## 2020-12-31 RX ADMIN — SODIUM CHLORIDE 500 ML: 9 INJECTION, SOLUTION INTRAVENOUS at 07:23

## 2020-12-31 ASSESSMENT — PAIN SCALES - GENERAL
PAINLEVEL_OUTOF10: 9
PAINLEVEL_OUTOF10: 6
PAINLEVEL_OUTOF10: 10
PAINLEVEL_OUTOF10: 9

## 2020-12-31 NOTE — ED NOTES
Pt states \"unable to walk to restroom attached to room due to pain\".  Pt placed in wheelchair to go to restroom     Kim Roberts RN  12/31/20 9880

## 2020-12-31 NOTE — ED PROVIDER NOTES
Department of Emergency Medicine   ED  Provider Note  Admit Date/RoomTime: 12/31/2020  5:20 AM  ED Room: 9048/8018-R          History of Present Illness:  12/31/20, Time: 7:20 AM EST  Chief Complaint   Patient presents with    Other     PT c/o sharp pain in BLE, calfs x 2 days. pt states bed ridden x 5 days w/ N/V/D x 3 days                Destiney Roche is a 48 y.o. female presenting to the ED for bilateral calf pain, beginning 2 days ago. The complaint has been constant, moderate in severity, and worsened by nothing. Patient reports bilateral calf pain for the last 2 days. She reports that last week she had nausea, vomiting, diarrhea for a few days. She reports that she laid around in bed secondary to this illness. She reports she noticed swelling to both lower legs. She reports her lower legs hurt. There was no trauma. No fevers or chills. No chest pain or shortness of breath. She says her nausea vomiting diarrhea all resolved and has been several days since she had any of the symptoms. She denies abdominal pain. No history of DVT or PE. She denies any other complaints. She also reports that almost 2 weeks ago she started taking Zyprexa for mental health. Review of Systems:   A complete review of systems was performed and pertinent positives and negatives are stated within HPI, all other systems reviewed and are negative.        --------------------------------------------- PAST HISTORY ---------------------------------------------  Past Medical History: She reports mental health history but denies other medical history    Past Surgical History: She reports cholecystectomy    Social History:      Family History: family history is not on file. . Unless otherwise noted, family history is non contributory    The patients home medications have been reviewed.     Allergies: Latex, Ibuprofen, Naproxen, and Vicodin [hydrocodone-acetaminophen]    I have reviewed the past medical history, past surgical history, social history, and family history    ---------------------------------------------------PHYSICAL EXAM--------------------------------------    Constitutional/General: Alert and oriented x3  Head: Normocephalic and atraumatic  Eyes: PERRL, EOMI, sclera non icteric  Mouth: Oropharynx clear, handling secretions  Neck: Supple, full ROM, no stridor, no meningeal signs  Respiratory: Lungs clear to auscultation bilaterally, no wheezes, rales, or rhonchi. Not in respiratory distress  Cardiovascular:  Regular rate. Regular rhythm. No murmurs, no aortic murmurs, no gallops, no rubs. 2+ distal pulses. Equal extremity pulses. Gastrointestinal:  Abdomen Soft, Non tender, Non distended. No rebound, guarding, or rigidity. No pulsatile masses. Musculoskeletal: Moves all extremities x 4. Warm and well perfused, no clubbing, no cyanosis, no edema. She has bilateral calf tenderness, no palpable cords. Both lower legs are swollen and tender to the touch. Compartments are soft however her legs are swollen from the knees down. Neurovascularly intact distally. 2+ DP/PT pulses. Capillary refill <3 secondary. Warm and well perfused. Sural, saphenous, deep peroneal, peroneal, and tibial nerves intact. Sensation intact. 5/5 strength. Achilies tendon intact. No evidence of compartment syndrome. Capillary refill <3 seconds  Skin: skin warm and dry. No rashes. Neurologic: GCS 15, no focal deficits   Psychiatric: Normal Affect          -------------------------------------------------- RESULTS -------------------------------------------------  I have personally reviewed all laboratory and imaging results for this patient. Results are listed below.      LABS: (Lab results interpreted by me)  Results for orders placed or performed during the hospital encounter of 12/31/20   CBC Auto Differential   Result Value Ref Range    WBC 14.0 (H) 4.5 - 11.5 E9/L    RBC 5.55 (H) 3.50 - 5.50 E12/L    Hemoglobin 16.7 (H) 11.5 - 15.5 g/dL Hematocrit 48.3 (H) 34.0 - 48.0 %    MCV 87.0 80.0 - 99.9 fL    MCH 30.1 26.0 - 35.0 pg    MCHC 34.6 (H) 32.0 - 34.5 %    RDW 13.5 11.5 - 15.0 fL    Platelets 367 579 - 521 E9/L    MPV 11.2 7.0 - 12.0 fL    Neutrophils % 71.1 43.0 - 80.0 %    Immature Granulocytes % 0.6 0.0 - 5.0 %    Lymphocytes % 16.6 (L) 20.0 - 42.0 %    Monocytes % 8.5 2.0 - 12.0 %    Eosinophils % 2.6 0.0 - 6.0 %    Basophils % 0.6 0.0 - 2.0 %    Neutrophils Absolute 9.96 (H) 1.80 - 7.30 E9/L    Immature Granulocytes # 0.09 E9/L    Lymphocytes Absolute 2.32 1.50 - 4.00 E9/L    Monocytes Absolute 1.19 (H) 0.10 - 0.95 E9/L    Eosinophils Absolute 0.37 0.05 - 0.50 E9/L    Basophils Absolute 0.08 0.00 - 0.20 P1/J   Basic metabolic panel   Result Value Ref Range    Sodium 135 132 - 146 mmol/L    Potassium 3.5 3.5 - 5.0 mmol/L    Chloride 104 98 - 107 mmol/L    CO2 22 22 - 29 mmol/L    Anion Gap 9 7 - 16 mmol/L    Glucose 128 (H) 74 - 99 mg/dL    BUN 16 6 - 20 mg/dL    CREATININE 1.0 0.5 - 1.0 mg/dL    GFR Non-African American 58 >=60 mL/min/1.73    GFR African American >60     Calcium 8.7 8.6 - 10.2 mg/dL   Troponin   Result Value Ref Range    Troponin <0.01 0.00 - 0.03 ng/mL   Magnesium   Result Value Ref Range    Magnesium 2.2 1.6 - 2.6 mg/dL   Brain Natriuretic Peptide   Result Value Ref Range    Pro-BNP 11 0 - 125 pg/mL   CK   Result Value Ref Range    Total CK 1,406 (H) 20 - 180 U/L   D-Dimer, Quantitative   Result Value Ref Range    D-Dimer, Quant 308 ng/mL DDU   ,       RADIOLOGY:  Interpreted by Radiologist unless otherwise specified  US DUP LOWER EXTREMITIES BILATERAL VENOUS   Final Result   No evidence of DVT in either lower extremity.       XR CHEST PORTABLE   Final Result   No acute cardiopulmonary disease.             ------------------------- NURSING NOTES AND VITALS REVIEWED ---------------------------   The nursing notes within the ED encounter and vital signs as below have been reviewed by myself  /89   Pulse 100   Temp 96 °F (35.6 °C) (Oral)   Resp 16   Ht 5' 11\" (1.803 m)   Wt 240 lb (108.9 kg)   SpO2 97%   BMI 33.47 kg/m²     Oxygen Saturation Interpretation: Normal    The patients available past medical records and past encounters were reviewed. ------------------------------ ED COURSE/MEDICAL DECISION MAKING----------------------  Medications   ondansetron (ZOFRAN) injection 4 mg (4 mg Intravenous Given 12/31/20 0621)   0.9 % sodium chloride bolus (0 mLs Intravenous Stopped 12/31/20 0826)   acetaminophen (TYLENOL) tablet 1,000 mg (1,000 mg Oral Given 12/31/20 0801)            I, Dr. Lena Elam, am the primary provider of record    Medical Decision Making:   Rhabdomyolysis, could be related to Zyprexa as this is a adverse effect of this medication. IV fluids were given. No signs of compartment syndrome. No signs of DVT. I spoke with medicine for admission to the hospital.    Oxygen Saturation Interpretation: 97 % on room air. Normal      Re-Evaluations:       No change      This patient's ED course included: a personal history and physicial examination, re-evaluation prior to disposition, IV medications and complex medical decision making and emergency management    This patient has remained hemodynamically stable during their ED course. Consultations:  [employer]    Counseling: The emergency provider has spoken with the patient and discussed todays results, in addition to providing specific details for the plan of care and counseling regarding the diagnosis and prognosis. Questions are answered at this time and they are agreeable with the plan.       --------------------------------- IMPRESSION AND DISPOSITION ---------------------------------    IMPRESSION  1. Non-traumatic rhabdomyolysis        DISPOSITION  Disposition: Admit to med/surg floor  Patient condition is stable        NOTE: This report was transcribed using voice recognition software.  Every effort was made to ensure accuracy; however, inadvertent computerized transcription errors may be present     Mady Cartagena MD  12/31/20 0510

## 2020-12-31 NOTE — PROGRESS NOTES
Physical Therapy    Facility/Department: [facility] SURGERY  Initial Assessment    NAME: Dragan Kuhn  : 1970  MRN: [id number]    Date of Service: 2020      Patient Diagnosis(es): The encounter diagnosis was Non-traumatic rhabdomyolysis. Referring Provider: VALENTIN Miguel     Evaluating PT:  Shadi Francis, PT, DPT [id number]    Room #:  8692/5031-X  Diagnosis:  Non-traumatic rhabdomyolysis  Precautions:  Fall risk  Equipment Needs:  w/w    SUBJECTIVE:    Pt lives in [place] in a bi-level home with 5 steps to enter with 1 rail. Pt ambulated with no device PTA. OBJECTIVE:   Initial Evaluation  Date:  Treatment Short Term/ Long Term   Goals   Was pt agreeable to Eval/treatment? yes     Does pt have pain? Severe pain in bilateral LE with left being greater then right. Bed Mobility  Rolling: independent  Supine to sit: independent  Sit to supine: NT  Scooting: independent  independent   Transfers Sit to stand: supervision  Stand to sit: supervision  Stand pivot: NT  independent   Ambulation   Pt unable due to pain. 150+ feet with AAD of needed modified independent    Stair negotiation: ascended and descended  NT  4 steps with 1 rail modified independent   ROM BLE:  WFL     Strength BLE:  Grossly 4/5     Balance Sitting EOB:  Independent  Static standing with w/w SBA  Sitting EOB:  independent  Dynamic Standing:  Modified independent   AM-PAC 6 Clicks 49/01       Pt is A & O x 3  Sensation:  Pt reported neuropathy in bilateral LEs      Patient education  Pt educated on PT objectives during eval and while in the hospital, hand placement during transfers with use of walker, use of walker to off load painful LE    Patient response to education:   Pt verbalized understanding Pt demonstrated skill Pt requires further education in this area   Yes  yes yes     ASSESSMENT:    Comments:  Pt found in bed.   Pt reported a lot of pain bilateral LE and reported nursing had just given her pain medication. Educated pt about use of walker to assist with off loading painful LE. Cueing required for hand placement during transfers. Once standing, pt unable to put left LE flat on floor when standing. Pt's standing tolerance less then one minute. Pt performed sit to stand twice during eval.  At end of eval, pt left sitting on the EOB with call light in reach. Pt's/ family goals   1. None stated    Patient and or family understand(s) diagnosis, prognosis, and plan of care. yes    PLAN OF CARE:    Current Treatment Recommendations     [] Strengthening     [] ROM   [x] Balance Training   [] Endurance Training   [x] Transfer Training   [x] Gait Training   [x] Stair Training   [] Positioning   [x] Safety and Education Training   [x] Patient/Caregiver Education   [] HEP  [] Other     Frequency of treatments: 2-5x/week x 1-2 weeks. Time in  1426  Time out  1435    Evaluation Time includes thorough review of current medical information, gathering information on past medical history/social history and prior level of function, completion of standardized testing/informal observation of tasks, assessment of data and education on plan of care and goals.     CPT codes:  [x] Low Complexity PT evaluation 46863  [] Moderate Complexity PT evaluation 36771  [] High Complexity PT evaluation 36693  [] PT Re-evaluation 09033  [] Gait training 71809 _ minutes  [] Therapeutic activities 55172 _ minutes  [] Therapeutic exercises 27049 _ minutes      Saima Lucas PT, DPT  PT [id number]

## 2020-12-31 NOTE — H&P
[facility] History and Physical      CHIEF COMPLAINT:  Severe pain in lower legs    History of Present Illness:     Patient is a 47 yo female patient with a past medical history of asthma, IBS, anxiety/depression/ PTSD/bipolar. She follows with PCP and juan miguel in [place]. Patient presented to the ER with complaints of severe pain in lower legs. Per patient she resides in [place] and is in town visiting family for the holiday. She drove in from [place] on 12/22. Reporting over [place] she started not feeling well with \" upset stomach, N/V. Reporting appetite was decreased at that time. She last threw up on the 29th. However, 2 days she started having sharp, stabbing pain in bilateral lower legs. Pain rated 10/10. Took tylenol at home with no relief. Per pt due to pain she was unable to walk or put pressure on feet. Symptoms severe, moderate, and constant. Nothing makes better or worse. She follows with juan miguel and per pt has anxiety/ depression/ PTSD. Reporting she has been on multiple medications in the past and has had side effects. She was started on zyprexa 1 month ago. Reporting she has allergy to NSAIDs and vicodin. Also reporting she needs nausea medication with any pain medication. Patient awake, alert, resting calmly in bed in no acute distress. Reporting pain 10/10. Reporting appetite  Good last 2-3 days. Denies fevers, sob, chest pain, dysuria, hematuria, hematochezia. Denies ill contacts. Patient reporting she does not know names or dosages of medications. Informant(s) for H&P:patient. Chart review     REVIEW OF SYSTEMS:  A comprehensive review of systems was negative except for: what is in the HPI      PMH:  No past medical history on file. Surgical History:  No past surgical history on file.     Medications Prior to Admission:    Prior to Admission medications    Not on File       Allergies:    Latex, Ibuprofen, Naproxen, and Vicodin [hydrocodone-acetaminophen]    Social History:        Family History:   family history is not on file. Does not know paternal family history  Mom: h/o cancer runs in famiy   Celiac diease       PHYSICAL EXAM:  Vitals:  /75   Pulse 84   Temp 98.2 °F (36.8 °C) (Oral)   Resp 16   SpO2 96%   General Appearance: alert and oriented to person, place and time and in no acute distress  Skin: warm and dry  Head: normocephalic and atraumatic  Neck: neck supple and non tender without mass   Pulmonary/Chest: diminished throughout to auscultation   Cardiovascular: normal rate, normal S1 and S2 and no carotid bruits  Abdomen: soft, non-tender, non-distended, normal bowel sounds  Extremities: no cyanosis, no clubbing, lower ext edema 1-2 + pitting   Neurologic: speech normal         LABS:  Recent Labs     12/31/20  0610      K 3.5      CO2 22   BUN 16   CREATININE 1.0   GLUCOSE 128*   CALCIUM 8.7       Recent Labs     12/31/20  0610   WBC 14.0*   RBC 5.55*   HGB 16.7*   HCT 48.3*   MCV 87.0   MCH 30.1   MCHC 34.6*   RDW 13.5      MPV 11.2       No results for input(s): POCGLU in the last 72 hours. Radiology:   US DUP LOWER EXTREMITIES BILATERAL VENOUS   Final Result   No evidence of DVT in either lower extremity. XR CHEST PORTABLE   Final Result   No acute cardiopulmonary disease. ASSESSMENT:      Active Problems:    Rhabdomyolysis  Resolved Problems:    * No resolved hospital problems. *      PLAN:    1. Acute rhabdomyolysis- possibly related to zyprexa. Pt reporting she felt ill over [place] with N/V/ \" upset stomach,\" decreased appetite. Soon after started having sharp, stabbing pain in bilateral legs with associated edema. CK total 1400 on presentation. Rehydrated in ER. Hold zyprexa. IVF. Lab monitoring. Pain management. 2. Bilateral lower extremity edema: likely associated to above. US negative for dvt. D dimer pending. 3. N/V: resolving.      4. Anxiety/ depression/ PTSD/ bipolar: reporting she follows

## 2021-01-01 LAB
ANION GAP SERPL CALCULATED.3IONS-SCNC: 11 MMOL/L (ref 7–16)
BUN BLDV-MCNC: 12 MG/DL (ref 6–20)
CALCIUM SERPL-MCNC: 8.6 MG/DL (ref 8.6–10.2)
CHLORIDE BLD-SCNC: 99 MMOL/L (ref 98–107)
CO2: 24 MMOL/L (ref 22–29)
CREAT SERPL-MCNC: 0.9 MG/DL (ref 0.5–1)
GFR AFRICAN AMERICAN: >60
GFR NON-AFRICAN AMERICAN: >60 ML/MIN/1.73
GLUCOSE BLD-MCNC: 127 MG/DL (ref 74–99)
HCT VFR BLD CALC: 44.6 % (ref 34–48)
HEMOGLOBIN: 14.6 G/DL (ref 11.5–15.5)
MCH RBC QN AUTO: 29.5 PG (ref 26–35)
MCHC RBC AUTO-ENTMCNC: 32.7 % (ref 32–34.5)
MCV RBC AUTO: 90.1 FL (ref 80–99.9)
PDW BLD-RTO: 13.5 FL (ref 11.5–15)
PLATELET # BLD: 221 E9/L (ref 130–450)
PMV BLD AUTO: 10.6 FL (ref 7–12)
POTASSIUM REFLEX MAGNESIUM: 3.8 MMOL/L (ref 3.5–5)
RBC # BLD: 4.95 E12/L (ref 3.5–5.5)
SODIUM BLD-SCNC: 134 MMOL/L (ref 132–146)
TOTAL CK: 7630 U/L (ref 20–180)
WBC # BLD: 15.1 E9/L (ref 4.5–11.5)

## 2021-01-01 PROCEDURE — 6360000002 HC RX W HCPCS: Performed by: NURSE PRACTITIONER

## 2021-01-01 PROCEDURE — 36415 COLL VENOUS BLD VENIPUNCTURE: CPT

## 2021-01-01 PROCEDURE — 6370000000 HC RX 637 (ALT 250 FOR IP): Performed by: NURSE PRACTITIONER

## 2021-01-01 PROCEDURE — 85027 COMPLETE CBC AUTOMATED: CPT

## 2021-01-01 PROCEDURE — 80048 BASIC METABOLIC PNL TOTAL CA: CPT

## 2021-01-01 PROCEDURE — 94640 AIRWAY INHALATION TREATMENT: CPT

## 2021-01-01 PROCEDURE — 1200000000 HC SEMI PRIVATE

## 2021-01-01 PROCEDURE — 82550 ASSAY OF CK (CPK): CPT

## 2021-01-01 PROCEDURE — 99232 SBSQ HOSP IP/OBS MODERATE 35: CPT | Performed by: INTERNAL MEDICINE

## 2021-01-01 PROCEDURE — 6370000000 HC RX 637 (ALT 250 FOR IP): Performed by: INTERNAL MEDICINE

## 2021-01-01 RX ORDER — OXYCODONE HYDROCHLORIDE AND ACETAMINOPHEN 5; 325 MG/1; MG/1
1 TABLET ORAL EVERY 4 HOURS PRN
Status: DISCONTINUED | OUTPATIENT
Start: 2021-01-01 | End: 2021-01-08 | Stop reason: HOSPADM

## 2021-01-01 RX ADMIN — OXYCODONE AND ACETAMINOPHEN 1 TABLET: 5; 325 TABLET ORAL at 02:19

## 2021-01-01 RX ADMIN — POTASSIUM CHLORIDE AND SODIUM CHLORIDE: 900; 150 INJECTION, SOLUTION INTRAVENOUS at 08:46

## 2021-01-01 RX ADMIN — POTASSIUM CHLORIDE AND SODIUM CHLORIDE: 900; 150 INJECTION, SOLUTION INTRAVENOUS at 15:24

## 2021-01-01 RX ADMIN — BUDESONIDE 500 MCG: 0.5 SUSPENSION RESPIRATORY (INHALATION) at 19:48

## 2021-01-01 RX ADMIN — OXYCODONE AND ACETAMINOPHEN 1 TABLET: 5; 325 TABLET ORAL at 12:45

## 2021-01-01 RX ADMIN — PROMETHAZINE HYDROCHLORIDE 12.5 MG: 25 TABLET ORAL at 03:21

## 2021-01-01 RX ADMIN — OXYCODONE AND ACETAMINOPHEN 1 TABLET: 5; 325 TABLET ORAL at 21:41

## 2021-01-01 RX ADMIN — ACETAMINOPHEN 650 MG: 325 TABLET ORAL at 00:06

## 2021-01-01 RX ADMIN — ARFORMOTEROL TARTRATE 15 MCG: 15 SOLUTION RESPIRATORY (INHALATION) at 19:48

## 2021-01-01 RX ADMIN — PANTOPRAZOLE SODIUM 40 MG: 40 TABLET, DELAYED RELEASE ORAL at 06:43

## 2021-01-01 RX ADMIN — OXYCODONE AND ACETAMINOPHEN 1 TABLET: 5; 325 TABLET ORAL at 08:46

## 2021-01-01 RX ADMIN — ENOXAPARIN SODIUM 40 MG: 40 INJECTION SUBCUTANEOUS at 12:44

## 2021-01-01 RX ADMIN — OXYCODONE AND ACETAMINOPHEN 1 TABLET: 5; 325 TABLET ORAL at 17:06

## 2021-01-01 ASSESSMENT — PAIN - FUNCTIONAL ASSESSMENT
PAIN_FUNCTIONAL_ASSESSMENT: PREVENTS OR INTERFERES SOME ACTIVE ACTIVITIES AND ADLS
PAIN_FUNCTIONAL_ASSESSMENT: PREVENTS OR INTERFERES SOME ACTIVE ACTIVITIES AND ADLS

## 2021-01-01 ASSESSMENT — PAIN SCALES - GENERAL
PAINLEVEL_OUTOF10: 10

## 2021-01-01 ASSESSMENT — PAIN DESCRIPTION - ONSET: ONSET: ON-GOING

## 2021-01-01 ASSESSMENT — PAIN DESCRIPTION - PAIN TYPE: TYPE: ACUTE PAIN

## 2021-01-01 ASSESSMENT — PAIN DESCRIPTION - PROGRESSION: CLINICAL_PROGRESSION: NOT CHANGED

## 2021-01-01 ASSESSMENT — PAIN DESCRIPTION - FREQUENCY
FREQUENCY: INTERMITTENT
FREQUENCY: INTERMITTENT

## 2021-01-01 ASSESSMENT — PAIN DESCRIPTION - LOCATION: LOCATION: LEG

## 2021-01-01 NOTE — PLAN OF CARE
Problem: Pain:  Goal: Pain level will decrease  Description: Pain level will decrease  Outcome: Met This Shift  Goal: Control of acute pain  Description: Control of acute pain  Outcome: Met This Shift     Problem: Falls - Risk of:  Goal: Will remain free from falls  Description: Will remain free from falls  Outcome: Met This Shift  Goal: Absence of physical injury  Description: Absence of physical injury  Outcome: Met This Shift     Problem: Musculor/Skeletal Functional Status  Goal: Highest potential functional level  Outcome: Met This Shift  Goal: Absence of falls  Outcome: Met This Shift

## 2021-01-01 NOTE — PROGRESS NOTES
[facility]  Hospitalist   Progress Note    Admitting Date and Time: 12/31/2020  5:20 AM  Admit Dx: Rhabdomyolysis [M62.82]     Seen for follow on rhabdo    Subjective:  Continues with BLE pain ,swelling ,denies CP ,sob or abd pain. Uneventful night otherwise. ROS: denies fever, chills, cp, sob, n/v, HA unless stated above.      sodium chloride flush  10 mL Intravenous 2 times per day    enoxaparin  40 mg Subcutaneous Daily    pantoprazole  40 mg Oral QAM AC    Arformoterol Tartrate  15 mcg Nebulization BID    budesonide  0.5 mg Nebulization BID         sodium chloride flush, 10 mL, PRN      promethazine, 12.5 mg, Q6H PRN    Or      ondansetron, 4 mg, Q6H PRN      polyethylene glycol, 17 g, Daily PRN      acetaminophen, 650 mg, Q6H PRN    Or      acetaminophen, 650 mg, Q6H PRN      morphine, 2 mg, Q3H PRN      albuterol, 1 ampule, Q6H PRN      oxyCODONE-acetaminophen, 1 tablet, Q6H PRN         Objective:    /64   Pulse 104   Temp 98.2 °F (36.8 °C)   Resp 16   Ht 5' 11\" (1.803 m)   Wt 250 lb (113.4 kg)   SpO2 98%   BMI 34.87 kg/m²   General Appearance: alert and oriented to person, place and time,  in no acute distress  Skin: warm and dry  Head: normocephalic and atraumatic  Eyes: pupils equal, round, and reactive to light, extraocular eye movements intact, conjunctivae normal  Neck: supple and non-tender without mass  Pulmonary/Chest: clear to auscultation bilaterally  Cardiovascular: normal rate, regular rhythm, normal S1 and S2  Abdomen: soft, non-tender, non-distended, normal bowel sounds, no masses or organomegaly  Extremities: no cyanosis, clubbing, with bipedal edema & local tenderness  Musculoskeletal: normal range of motion  Neurologic:  no cranial nerve deficit,speech normal      Recent Labs     12/31/20  0610 01/01/21  0433    134   K 3.5 3.8    99   CO2 22 24   BUN 16 12   CREATININE 1.0 0.9   GLUCOSE 128* 127*   CALCIUM 8.7 8.6       Recent Labs 12/31/20  0610 01/01/21  0433   WBC 14.0* 15.1*   RBC 5.55* 4.95   HGB 16.7* 14.6   HCT 48.3* 44.6   MCV 87.0 90.1   MCH 30.1 29.5   MCHC 34.6* 32.7   RDW 13.5 13.5    221   MPV 11.2 10.6       Labs and images reviewed     Radiology:   CTA CHEST W CONTRAST   Final Result   No evidence of pulmonary embolism or acute pulmonary abnormality. 1.4 cm left adrenal nodule, likely adenoma. US DUP LOWER EXTREMITIES BILATERAL VENOUS   Final Result   No evidence of DVT in either lower extremity. XR CHEST PORTABLE   Final Result   No acute cardiopulmonary disease. Assessment:    Active Problems:    Rhabdomyolysis  Resolved Problems:    * No resolved hospital problems. *      Plan:  Acute rhabdomyolysis-possibly related to Zyprexa, continue IV fluids and other supportive treatment. Hold Zyprexa. Monitor Cks. Pain management. PT/OT/ following on discharge planning.     Bipedal edema with pain and tenderness-likely secondary to above , ultrasound negative for DVT, D-dimer elevated , CTA chest neg. Anxiety/depression/PTSD/bipolar-follows with psych in [place]. She has been on multiple meds and had side effects and needed to stop. As above we will hold Zyprexa and at present she does not want to try any new meds.     Asthma-continue as per home  IBS with chronic diarrhea  Leukocytosis-improving, reactive, likely secondary to volume contraction, monitor    On Lovenox for DVT prophylaxis  Full code          Electronically signed by Tarsha Olmos MD on 1/1/2021 at 7:59 AM

## 2021-01-01 NOTE — PROGRESS NOTES
Patient is very upset and complaining of 10/10 pain. Patient was given percocet. I explained to patient that she has IV morphine for break thorough pain and I can administer the  Morphine but she refuses.

## 2021-01-01 NOTE — PROGRESS NOTES
P Quality Flow/Interdisciplinary Rounds Progress Note        Quality Flow Rounds held on January 1, 2021    Disciplines Attending:  Bedside Nurse, ,  and Nursing Unit Leadership    Justine Castro was admitted on 12/31/2020  5:20 AM    Anticipated Discharge Date:  Expected Discharge Date: 01/03/21    Disposition:    Natan Score:  Naatn Scale Score: 20    Readmission Risk              Risk of Unplanned Readmission:        11           Discussed patient goal for the day, patient clinical progression, and barriers to discharge.   The following Goal(s) of the Day/Commitment(s) have been identified:  Pain control       [facility]  January 1, 2021

## 2021-01-02 LAB
ANION GAP SERPL CALCULATED.3IONS-SCNC: 7 MMOL/L (ref 7–16)
BASOPHILS ABSOLUTE: 0.05 E9/L (ref 0–0.2)
BASOPHILS RELATIVE PERCENT: 0.4 % (ref 0–2)
BUN BLDV-MCNC: 8 MG/DL (ref 6–20)
CALCIUM SERPL-MCNC: 8.2 MG/DL (ref 8.6–10.2)
CHLORIDE BLD-SCNC: 104 MMOL/L (ref 98–107)
CO2: 21 MMOL/L (ref 22–29)
CREAT SERPL-MCNC: 0.7 MG/DL (ref 0.5–1)
EOSINOPHILS ABSOLUTE: 0.08 E9/L (ref 0.05–0.5)
EOSINOPHILS RELATIVE PERCENT: 0.6 % (ref 0–6)
GFR AFRICAN AMERICAN: >60
GFR NON-AFRICAN AMERICAN: >60 ML/MIN/1.73
GLUCOSE BLD-MCNC: 107 MG/DL (ref 74–99)
HCT VFR BLD CALC: 41.8 % (ref 34–48)
HEMOGLOBIN: 13.9 G/DL (ref 11.5–15.5)
IMMATURE GRANULOCYTES #: 0.11 E9/L
IMMATURE GRANULOCYTES %: 0.8 % (ref 0–5)
LYMPHOCYTES ABSOLUTE: 1.4 E9/L (ref 1.5–4)
LYMPHOCYTES RELATIVE PERCENT: 10 % (ref 20–42)
MCH RBC QN AUTO: 29.6 PG (ref 26–35)
MCHC RBC AUTO-ENTMCNC: 33.3 % (ref 32–34.5)
MCV RBC AUTO: 89.1 FL (ref 80–99.9)
MONOCYTES ABSOLUTE: 1.27 E9/L (ref 0.1–0.95)
MONOCYTES RELATIVE PERCENT: 9.1 % (ref 2–12)
NEUTROPHILS ABSOLUTE: 11.04 E9/L (ref 1.8–7.3)
NEUTROPHILS RELATIVE PERCENT: 79.1 % (ref 43–80)
PDW BLD-RTO: 13.6 FL (ref 11.5–15)
PLATELET # BLD: 199 E9/L (ref 130–450)
PMV BLD AUTO: 10.7 FL (ref 7–12)
POTASSIUM SERPL-SCNC: 4.2 MMOL/L (ref 3.5–5)
RBC # BLD: 4.69 E12/L (ref 3.5–5.5)
SODIUM BLD-SCNC: 132 MMOL/L (ref 132–146)
TOTAL CK: ABNORMAL U/L (ref 20–180)
WBC # BLD: 14 E9/L (ref 4.5–11.5)

## 2021-01-02 PROCEDURE — 82550 ASSAY OF CK (CPK): CPT

## 2021-01-02 PROCEDURE — 6370000000 HC RX 637 (ALT 250 FOR IP): Performed by: INTERNAL MEDICINE

## 2021-01-02 PROCEDURE — 36415 COLL VENOUS BLD VENIPUNCTURE: CPT

## 2021-01-02 PROCEDURE — 80048 BASIC METABOLIC PNL TOTAL CA: CPT

## 2021-01-02 PROCEDURE — 94640 AIRWAY INHALATION TREATMENT: CPT

## 2021-01-02 PROCEDURE — 97165 OT EVAL LOW COMPLEX 30 MIN: CPT

## 2021-01-02 PROCEDURE — 1200000000 HC SEMI PRIVATE

## 2021-01-02 PROCEDURE — 85025 COMPLETE CBC W/AUTO DIFF WBC: CPT

## 2021-01-02 PROCEDURE — 6360000002 HC RX W HCPCS: Performed by: NURSE PRACTITIONER

## 2021-01-02 PROCEDURE — 6370000000 HC RX 637 (ALT 250 FOR IP): Performed by: NURSE PRACTITIONER

## 2021-01-02 PROCEDURE — 99232 SBSQ HOSP IP/OBS MODERATE 35: CPT | Performed by: INTERNAL MEDICINE

## 2021-01-02 RX ADMIN — ARFORMOTEROL TARTRATE 15 MCG: 15 SOLUTION RESPIRATORY (INHALATION) at 09:11

## 2021-01-02 RX ADMIN — OXYCODONE AND ACETAMINOPHEN 1 TABLET: 5; 325 TABLET ORAL at 14:13

## 2021-01-02 RX ADMIN — POTASSIUM CHLORIDE AND SODIUM CHLORIDE: 900; 150 INJECTION, SOLUTION INTRAVENOUS at 14:15

## 2021-01-02 RX ADMIN — POTASSIUM CHLORIDE AND SODIUM CHLORIDE: 900; 150 INJECTION, SOLUTION INTRAVENOUS at 06:38

## 2021-01-02 RX ADMIN — OXYCODONE AND ACETAMINOPHEN 1 TABLET: 5; 325 TABLET ORAL at 22:42

## 2021-01-02 RX ADMIN — POTASSIUM CHLORIDE AND SODIUM CHLORIDE: 900; 150 INJECTION, SOLUTION INTRAVENOUS at 00:07

## 2021-01-02 RX ADMIN — OXYCODONE AND ACETAMINOPHEN 1 TABLET: 5; 325 TABLET ORAL at 01:59

## 2021-01-02 RX ADMIN — OXYCODONE AND ACETAMINOPHEN 1 TABLET: 5; 325 TABLET ORAL at 05:58

## 2021-01-02 RX ADMIN — OXYCODONE AND ACETAMINOPHEN 1 TABLET: 5; 325 TABLET ORAL at 09:55

## 2021-01-02 RX ADMIN — ENOXAPARIN SODIUM 40 MG: 40 INJECTION SUBCUTANEOUS at 13:09

## 2021-01-02 RX ADMIN — PANTOPRAZOLE SODIUM 40 MG: 40 TABLET, DELAYED RELEASE ORAL at 06:51

## 2021-01-02 RX ADMIN — OXYCODONE AND ACETAMINOPHEN 1 TABLET: 5; 325 TABLET ORAL at 18:38

## 2021-01-02 RX ADMIN — POTASSIUM CHLORIDE AND SODIUM CHLORIDE 150 ML/HR: 900; 150 INJECTION, SOLUTION INTRAVENOUS at 20:35

## 2021-01-02 RX ADMIN — BUDESONIDE 500 MCG: 0.5 SUSPENSION RESPIRATORY (INHALATION) at 09:11

## 2021-01-02 ASSESSMENT — PAIN SCALES - GENERAL
PAINLEVEL_OUTOF10: 2
PAINLEVEL_OUTOF10: 10
PAINLEVEL_OUTOF10: 10
PAINLEVEL_OUTOF10: 4
PAINLEVEL_OUTOF10: 0

## 2021-01-02 ASSESSMENT — PAIN DESCRIPTION - FREQUENCY: FREQUENCY: CONTINUOUS

## 2021-01-02 ASSESSMENT — PAIN - FUNCTIONAL ASSESSMENT
PAIN_FUNCTIONAL_ASSESSMENT: PREVENTS OR INTERFERES SOME ACTIVE ACTIVITIES AND ADLS

## 2021-01-02 ASSESSMENT — PAIN DESCRIPTION - PAIN TYPE
TYPE: ACUTE PAIN
TYPE: ACUTE PAIN

## 2021-01-02 ASSESSMENT — PAIN DESCRIPTION - DESCRIPTORS: DESCRIPTORS: ACHING;DISCOMFORT

## 2021-01-02 ASSESSMENT — PAIN DESCRIPTION - ORIENTATION
ORIENTATION: RIGHT;LEFT
ORIENTATION: RIGHT;LEFT
ORIENTATION: LEFT

## 2021-01-02 ASSESSMENT — PAIN DESCRIPTION - PROGRESSION: CLINICAL_PROGRESSION: NOT CHANGED

## 2021-01-02 ASSESSMENT — PAIN DESCRIPTION - ONSET: ONSET: ON-GOING

## 2021-01-02 NOTE — PROGRESS NOTES
[facility] Hospitalist   Progress Note    Admitting Date and Time: 12/31/2020  5:20 AM  Admit Dx: Rhabdomyolysis [M62.82]     Seen for follow on rhabdo    Subjective:  CKs up trending , d/w nursing , no overnight issues , denies CP ,sob or abd pain. Continues with BLE pain /swelling - current pain meds helping. ROS: denies fever, chills, cp, sob, n/v, HA unless stated above.      sodium chloride flush  10 mL Intravenous 2 times per day    enoxaparin  40 mg Subcutaneous Daily    pantoprazole  40 mg Oral QAM AC    Arformoterol Tartrate  15 mcg Nebulization BID    budesonide  0.5 mg Nebulization BID         oxyCODONE-acetaminophen, 1 tablet, Q4H PRN      sodium chloride flush, 10 mL, PRN      promethazine, 12.5 mg, Q6H PRN    Or      ondansetron, 4 mg, Q6H PRN      polyethylene glycol, 17 g, Daily PRN      acetaminophen, 650 mg, Q6H PRN    Or      acetaminophen, 650 mg, Q6H PRN      morphine, 2 mg, Q3H PRN      albuterol, 1 ampule, Q6H PRN         Objective:    /60   Pulse 107   Temp 98 °F (36.7 °C) (Oral)   Resp 16   Ht 5' 11\" (1.803 m)   Wt 250 lb (113.4 kg)   SpO2 95%   BMI 34.87 kg/m²   General Appearance: alert and oriented to person, place and time,  in no acute distress  Skin: warm and dry  Head: normocephalic and atraumatic  Eyes: pupils equal, round, and reactive to light, extraocular eye movements intact, conjunctivae normal  Neck: supple and non-tender without mass  Pulmonary/Chest: clear to auscultation bilaterally  Cardiovascular: normal rate, regular rhythm, normal S1 and S2  Abdomen: soft, non-tender, non-distended, normal bowel sounds, no masses or organomegaly  Extremities: no cyanosis, clubbing, with bipedal edema & local tenderness  Musculoskeletal: normal range of motion  Neurologic:  no cranial nerve deficit,speech normal      Recent Labs     12/31/20  0610 01/01/21  0433 01/02/21  0400    134 132   K 3.5 3.8 4.2    99 104   CO2 22 24 21* BUN 16 12 8   CREATININE 1.0 0.9 0.7   GLUCOSE 128* 127* 107*   CALCIUM 8.7 8.6 8.2*       Recent Labs     12/31/20  0610 01/01/21  0433 01/02/21  0400   WBC 14.0* 15.1* 14.0*   RBC 5.55* 4.95 4.69   HGB 16.7* 14.6 13.9   HCT 48.3* 44.6 41.8   MCV 87.0 90.1 89.1   MCH 30.1 29.5 29.6   MCHC 34.6* 32.7 33.3   RDW 13.5 13.5 13.6    221 199   MPV 11.2 10.6 10.7       Labs and images reviewed     Radiology:   CTA CHEST W CONTRAST   Final Result   No evidence of pulmonary embolism or acute pulmonary abnormality. 1.4 cm left adrenal nodule, likely adenoma. US DUP LOWER EXTREMITIES BILATERAL VENOUS   Final Result   No evidence of DVT in either lower extremity. XR CHEST PORTABLE   Final Result   No acute cardiopulmonary disease. Assessment:    Active Problems:    Rhabdomyolysis  Resolved Problems:    * No resolved hospital problems. *      Plan:  Acute rhabdomyolysis-possibly related to Zyprexa, continue IV fluids and other supportive treatment. Hold Zyprexa. Monitor Cks. Pain management. PT/OT/ following on discharge planning. AM-PAC score 19/24  , will be able to discharge home with self care. CKs up trending ,  Continue to monitor.     Bipedal edema with pain and tenderness-likely secondary to above , ultrasound negative for DVT, D-dimer elevated , CTA chest neg. Anxiety/depression/PTSD/bipolar-follows with psych in [place]. She has been on multiple meds and had side effects and needed to stop. As above we will hold Zyprexa and at present she does not want to try any new meds.     Asthma-continue as per home  IBS with chronic diarrhea  Leukocytosis-improving, reactive, likely secondary to volume contraction, monitor    On Lovenox for DVT prophylaxis  Full code          Electronically signed by Drew Chowdhury MD on 1/2/2021 at 12:58 PM

## 2021-01-02 NOTE — PROGRESS NOTES
Occupational Therapy  OCCUPATIONAL THERAPY INITIAL EVALUATION      Date:2021  Patient Name: Amos Norris  MRN: [id number]  : 1970  Room: [address] [place], [place] [address]    Referring Provider: LENNY Teran    Evaluating OT: Lester Mares OTR/L [id number]    AM-PAC Daily Activity Raw Score:     Recommended Adaptive Equipment:  TBD     Diagnosis: Rhabdomyolysis    Pertinent Medical History: PTSD, bipolar, anxiety/depression   Precautions:  Falls, alarm      Home Living: Pt lives in [place] with her son. Here visiting   Staying in 1 story house with 5 steps to enter. Independent, occasionally uses quad cane. Drives      Pain Level:  B LE pain ;   Cognition: alert, oriented x3 and conversing    Memory:  good   Sequencing:   Good    Problem solving:  Fair    Judgement/safety:  Fair      Functional Assessment:   Initial Eval Status  Date: 20 Treatment Status  Date: STGs = LTGs  Time frame: 5-7 days   Feeding Independent      Grooming Set-up, seated  Decrease standing tolerance, LE pain   Mod I    UB Dressing Set-up   Mod I    LB Dressing Mod A   Mod I    Bathing      Toileting Assist with through hygiene   Independent    Bed Mobility  Supervision   Sit-supine      Functional Transfers SBA  BSC to bed (low squat pivot) patient refusing to stand , c/o increase LE pain     Able to scoot self to [facility]   Mod I    Functional Mobility Returned to bed   Mod I with good tolerance    Balance Sitting:     Static:  Independent   Standing: declined   Independent   with good tolerance    Activity Tolerance Fair - with light activity   Pain limiting tolerance   Patient also self limiting  Good with ADL activity    Visual/  Perceptual Glasses: none by bedside                 Hand Dominance right    Strength ROM Additional Info:    RUE  4/5  WFL good  and wfl FMC/dexterity noted during ADL tasks       LUE 4/5  WFL good  and wfl FMC/dexterity noted during ADL tasks       [facility]   Sensation:  No c/o numbness or tingling Tone: WFL     Comments: Upon arrival patient sitting on BSC . At end of session, patient lying in bed  with call light and phone within reach, all lines and tubes intact. ALARM  ON       Overall patient demonstrated  decreased independence and safety during completion of ADL/functional transfer/mobility tasks. Pt would benefit from continued skilled OT to increase safety and independence with completion of ADL/IADL tasks for functional independence and quality of life. Assessment of current deficits   Functional mobility [x]  ADLs [x] Strength [x]  Cognition []  Functional transfers  [x] IADLs [x] Safety Awareness [x]  Endurance [x]  Fine Motor Coordination [] Balance [x] Vision/perception [] Sensation []   Gross Motor Coordination [] ROM [] Delirium []                  Motor Control []       Plan of Care: 1-3 days/week for 1-2 weeks PRN   [x]ADL retraining/adapted techniques and AE recommendations to increase functional independence within precautions                    [x]Energy conservation techniques to improve tolerance for selfcare routine   [x]Functional transfer/mobility training/DME recommendations for increased independence, safety and fall prevention         [x]Patient/family education to increase safety and functional independence             [x]Environmental modifications for safe mobility and completion of ADLs                             []Cognitive retraining ex's to improve problem solving skills & safe participation in ADLs/IADLs     []Sensory re-education techniques to improve extremity awareness, maintain skin integrity and improve hand function                             []Visual/Perceptual retraining  to improve body awareness and safety during transfers and ADLs  []Splinting/positioning needs to maintain joint/skin integrity and prevent contractures  [x]Therapeutic activity to improve functional performance during ADLs.                                          [x]Therapeutic exercise to improve tolerance and functional strength for ADLs   [x]Balance retraining/tolerance tasks for facilitation of postural control with dynamic challenges during ADLs . []Neuromuscular re-education: facilitation of righting/equilibrium reactions, midline orientation, scapular stability/mobility, Normalization muscle     tone and facilitation active functional movement/Attention                         []Delirium prevention/treatment    [x]Positioning to improve functional independence  []Other:       Rehab Potential:  Good for established goals     Patient / Family Goal: return home      Patient and/or family were instructed on functional diagnosis, prognosis/goals and OT plan of care. Demonstrated fair  understanding. Eval Complexity: Low      Time In:0900  Time Out: 0915        Min Units   OT Eval Low 97165  x 1   OT Eval Medium 42376      OT Eval High 05581       OT Re-Eval [id number]       Therapeutic Ex 86602       Therapeutic Activities 20689       ADL/Self Care 80139       Orthotic Management 64623       Neuro Re-Ed 22342       Non-Billable Time          Evaluation Time includes thorough review of current medical information, gathering information on past medical history/social history and prior level of function, completion of standardized testing/informal observation of tasks, assessment of data and education on plan of care and goals.             Samantha West OTR/L [id number]

## 2021-01-02 NOTE — PROGRESS NOTES
Labs were being drawn when patient pulled her hand away crying, unable to obtain full AM labs.  Patient refusing labs this am.

## 2021-01-03 LAB
ANION GAP SERPL CALCULATED.3IONS-SCNC: 7 MMOL/L (ref 7–16)
BASOPHILS ABSOLUTE: 0.04 E9/L (ref 0–0.2)
BASOPHILS RELATIVE PERCENT: 0.4 % (ref 0–2)
BUN BLDV-MCNC: 5 MG/DL (ref 6–20)
CALCIUM SERPL-MCNC: 7.9 MG/DL (ref 8.6–10.2)
CHLORIDE BLD-SCNC: 103 MMOL/L (ref 98–107)
CO2: 22 MMOL/L (ref 22–29)
CREAT SERPL-MCNC: 0.7 MG/DL (ref 0.5–1)
EKG ATRIAL RATE: 105 BPM
EKG P AXIS: -7 DEGREES
EKG P-R INTERVAL: 130 MS
EKG Q-T INTERVAL: 342 MS
EKG QRS DURATION: 84 MS
EKG QTC CALCULATION (BAZETT): 452 MS
EKG R AXIS: -45 DEGREES
EKG T AXIS: 102 DEGREES
EKG VENTRICULAR RATE: 105 BPM
EOSINOPHILS ABSOLUTE: 0.11 E9/L (ref 0.05–0.5)
EOSINOPHILS RELATIVE PERCENT: 1 % (ref 0–6)
GFR AFRICAN AMERICAN: >60
GFR NON-AFRICAN AMERICAN: >60 ML/MIN/1.73
GLUCOSE BLD-MCNC: 107 MG/DL (ref 74–99)
HCT VFR BLD CALC: 38 % (ref 34–48)
HEMOGLOBIN: 12.7 G/DL (ref 11.5–15.5)
IMMATURE GRANULOCYTES #: 0.09 E9/L
IMMATURE GRANULOCYTES %: 0.8 % (ref 0–5)
LYMPHOCYTES ABSOLUTE: 0.97 E9/L (ref 1.5–4)
LYMPHOCYTES RELATIVE PERCENT: 9 % (ref 20–42)
MCH RBC QN AUTO: 30.5 PG (ref 26–35)
MCHC RBC AUTO-ENTMCNC: 33.4 % (ref 32–34.5)
MCV RBC AUTO: 91.3 FL (ref 80–99.9)
MONOCYTES ABSOLUTE: 0.96 E9/L (ref 0.1–0.95)
MONOCYTES RELATIVE PERCENT: 8.9 % (ref 2–12)
NEUTROPHILS ABSOLUTE: 8.56 E9/L (ref 1.8–7.3)
NEUTROPHILS RELATIVE PERCENT: 79.9 % (ref 43–80)
PDW BLD-RTO: 13.9 FL (ref 11.5–15)
PLATELET # BLD: 145 E9/L (ref 130–450)
PMV BLD AUTO: 11.5 FL (ref 7–12)
POTASSIUM SERPL-SCNC: 4.2 MMOL/L (ref 3.5–5)
RBC # BLD: 4.16 E12/L (ref 3.5–5.5)
SODIUM BLD-SCNC: 132 MMOL/L (ref 132–146)
TOTAL CK: ABNORMAL U/L (ref 20–180)
WBC # BLD: 10.7 E9/L (ref 4.5–11.5)

## 2021-01-03 PROCEDURE — 6370000000 HC RX 637 (ALT 250 FOR IP): Performed by: INTERNAL MEDICINE

## 2021-01-03 PROCEDURE — 85025 COMPLETE CBC W/AUTO DIFF WBC: CPT

## 2021-01-03 PROCEDURE — 1200000000 HC SEMI PRIVATE

## 2021-01-03 PROCEDURE — 82550 ASSAY OF CK (CPK): CPT

## 2021-01-03 PROCEDURE — 36415 COLL VENOUS BLD VENIPUNCTURE: CPT

## 2021-01-03 PROCEDURE — 80048 BASIC METABOLIC PNL TOTAL CA: CPT

## 2021-01-03 PROCEDURE — 6370000000 HC RX 637 (ALT 250 FOR IP): Performed by: NURSE PRACTITIONER

## 2021-01-03 PROCEDURE — 99232 SBSQ HOSP IP/OBS MODERATE 35: CPT | Performed by: INTERNAL MEDICINE

## 2021-01-03 PROCEDURE — 93010 ELECTROCARDIOGRAM REPORT: CPT | Performed by: INTERNAL MEDICINE

## 2021-01-03 PROCEDURE — 6360000002 HC RX W HCPCS: Performed by: NURSE PRACTITIONER

## 2021-01-03 RX ADMIN — POTASSIUM CHLORIDE AND SODIUM CHLORIDE 150 ML/HR: 900; 150 INJECTION, SOLUTION INTRAVENOUS at 05:10

## 2021-01-03 RX ADMIN — PANTOPRAZOLE SODIUM 40 MG: 40 TABLET, DELAYED RELEASE ORAL at 05:10

## 2021-01-03 RX ADMIN — OXYCODONE AND ACETAMINOPHEN 1 TABLET: 5; 325 TABLET ORAL at 09:28

## 2021-01-03 RX ADMIN — OXYCODONE AND ACETAMINOPHEN 1 TABLET: 5; 325 TABLET ORAL at 03:04

## 2021-01-03 RX ADMIN — OXYCODONE AND ACETAMINOPHEN 1 TABLET: 5; 325 TABLET ORAL at 15:01

## 2021-01-03 RX ADMIN — ENOXAPARIN SODIUM 40 MG: 40 INJECTION SUBCUTANEOUS at 12:48

## 2021-01-03 RX ADMIN — POTASSIUM CHLORIDE AND SODIUM CHLORIDE: 900; 150 INJECTION, SOLUTION INTRAVENOUS at 18:50

## 2021-01-03 RX ADMIN — OXYCODONE AND ACETAMINOPHEN 1 TABLET: 5; 325 TABLET ORAL at 19:26

## 2021-01-03 ASSESSMENT — PAIN DESCRIPTION - DESCRIPTORS: DESCRIPTORS: ACHING;DISCOMFORT

## 2021-01-03 ASSESSMENT — PAIN DESCRIPTION - PROGRESSION: CLINICAL_PROGRESSION: NOT CHANGED

## 2021-01-03 ASSESSMENT — PAIN DESCRIPTION - LOCATION
LOCATION: LEG
LOCATION: LEG

## 2021-01-03 ASSESSMENT — PAIN DESCRIPTION - FREQUENCY: FREQUENCY: CONTINUOUS

## 2021-01-03 ASSESSMENT — PAIN SCALES - GENERAL
PAINLEVEL_OUTOF10: 0
PAINLEVEL_OUTOF10: 0

## 2021-01-03 ASSESSMENT — PAIN DESCRIPTION - PAIN TYPE: TYPE: ACUTE PAIN

## 2021-01-03 NOTE — PROGRESS NOTES
[facility]  Hospitalist   Progress Note    Admitting Date and Time: 12/31/2020  5:20 AM  Admit Dx: Rhabdomyolysis [M62.82]     Seen for follow on rhabdo    Subjective:  Continues with bilateral leg pain, swelling, difficulty ambulating. Working with PT/OT. Denies any chest pain short of breath, nausea vomiting or abdominal pain. Uneventful night. Discussed with nursing. ROS: denies fever, chills, cp, sob, n/v, HA unless stated above.      sodium chloride flush  10 mL Intravenous 2 times per day    enoxaparin  40 mg Subcutaneous Daily    pantoprazole  40 mg Oral QAM AC    Arformoterol Tartrate  15 mcg Nebulization BID    budesonide  0.5 mg Nebulization BID         oxyCODONE-acetaminophen, 1 tablet, Q4H PRN      sodium chloride flush, 10 mL, PRN      promethazine, 12.5 mg, Q6H PRN    Or      ondansetron, 4 mg, Q6H PRN      polyethylene glycol, 17 g, Daily PRN      acetaminophen, 650 mg, Q6H PRN    Or      acetaminophen, 650 mg, Q6H PRN      morphine, 2 mg, Q3H PRN      albuterol, 1 ampule, Q6H PRN         Objective:    /75   Pulse 107   Temp 99.8 °F (37.7 °C) (Oral)   Resp 17   Ht 5' 11\" (1.803 m)   Wt 250 lb (113.4 kg)   SpO2 97%   BMI 34.87 kg/m²   General Appearance: alert and oriented to person, place and time,  in no acute distress  Skin: warm and dry  Head: normocephalic and atraumatic  Eyes: pupils equal, round, and reactive to light, extraocular eye movements intact, conjunctivae normal  Neck: supple and non-tender without mass  Pulmonary/Chest: clear to auscultation bilaterally  Cardiovascular: normal rate, regular rhythm, normal S1 and S2  Abdomen: soft, non-tender, non-distended, normal bowel sounds, no masses or organomegaly  Extremities: no cyanosis, clubbing, with bipedal edema & local tenderness  Musculoskeletal: normal range of motion  Neurologic:  no cranial nerve deficit,speech normal      Recent Labs     01/01/21  0433 01/02/21  0400 01/03/21  0930  132 132   K 3.8 4.2 4.2   CL 99 104 103   CO2 24 21* 22   BUN 12 8 5*   CREATININE 0.9 0.7 0.7   GLUCOSE 127* 107* 107*   CALCIUM 8.6 8.2* 7.9*       Recent Labs     01/01/21  0433 01/02/21  0400 01/03/21  0930   WBC 15.1* 14.0* 10.7   RBC 4.95 4.69 4.16   HGB 14.6 13.9 12.7   HCT 44.6 41.8 38.0   MCV 90.1 89.1 91.3   MCH 29.5 29.6 30.5   MCHC 32.7 33.3 33.4   RDW 13.5 13.6 13.9    199 145   MPV 10.6 10.7 11.5       Labs and images reviewed     Radiology:   CTA CHEST W CONTRAST   Final Result   No evidence of pulmonary embolism or acute pulmonary abnormality. 1.4 cm left adrenal nodule, likely adenoma. US DUP LOWER EXTREMITIES BILATERAL VENOUS   Final Result   No evidence of DVT in either lower extremity. XR CHEST PORTABLE   Final Result   No acute cardiopulmonary disease. Assessment:    Active Problems:    Rhabdomyolysis  Resolved Problems:    * No resolved hospital problems. *      Plan:  Acute rhabdomyolysis-possibly related to Zyprexa, continue IV fluids and other supportive treatment. Hold Zyprexa. Monitor Cks. Pain management. PT/OT/ following on discharge planning. AM-PAC score 19/24  , will be able to discharge home with self. Continue to monitor. Noted CK down trending.     Bipedal edema with pain and tenderness-likely secondary to above , ultrasound negative for DVT, D-dimer elevated , CTA chest neg. Anxiety/depression/PTSD/bipolar-follows with psych in [place]. She has been on multiple meds and had side effects and needed to stop. As above we will hold Zyprexa and at present she does not want to try any new meds.     Asthma-continue as per home  IBS with chronic diarrhea  Leukocytosis-resolved, reactive, likely secondary to volume contraction    On Lovenox for DVT prophylaxis  Full code          Electronically signed by Trish Daugherty MD on 1/3/2021 at 12:44 PM

## 2021-01-04 LAB
ANION GAP SERPL CALCULATED.3IONS-SCNC: 6 MMOL/L (ref 7–16)
BUN BLDV-MCNC: 7 MG/DL (ref 6–20)
CALCIUM SERPL-MCNC: 7.9 MG/DL (ref 8.6–10.2)
CHLORIDE BLD-SCNC: 105 MMOL/L (ref 98–107)
CO2: 24 MMOL/L (ref 22–29)
CREAT SERPL-MCNC: 0.7 MG/DL (ref 0.5–1)
GFR AFRICAN AMERICAN: >60
GFR NON-AFRICAN AMERICAN: >60 ML/MIN/1.73
GLUCOSE BLD-MCNC: 101 MG/DL (ref 74–99)
POTASSIUM SERPL-SCNC: 3.8 MMOL/L (ref 3.5–5)
SODIUM BLD-SCNC: 135 MMOL/L (ref 132–146)
TOTAL CK: 8531 U/L (ref 20–180)

## 2021-01-04 PROCEDURE — 99232 SBSQ HOSP IP/OBS MODERATE 35: CPT | Performed by: INTERNAL MEDICINE

## 2021-01-04 PROCEDURE — 97530 THERAPEUTIC ACTIVITIES: CPT

## 2021-01-04 PROCEDURE — 6370000000 HC RX 637 (ALT 250 FOR IP): Performed by: INTERNAL MEDICINE

## 2021-01-04 PROCEDURE — 36415 COLL VENOUS BLD VENIPUNCTURE: CPT

## 2021-01-04 PROCEDURE — 97535 SELF CARE MNGMENT TRAINING: CPT

## 2021-01-04 PROCEDURE — 6360000002 HC RX W HCPCS: Performed by: NURSE PRACTITIONER

## 2021-01-04 PROCEDURE — 1200000000 HC SEMI PRIVATE

## 2021-01-04 PROCEDURE — 97110 THERAPEUTIC EXERCISES: CPT

## 2021-01-04 PROCEDURE — 82550 ASSAY OF CK (CPK): CPT

## 2021-01-04 PROCEDURE — 80048 BASIC METABOLIC PNL TOTAL CA: CPT

## 2021-01-04 PROCEDURE — 6370000000 HC RX 637 (ALT 250 FOR IP): Performed by: NURSE PRACTITIONER

## 2021-01-04 RX ADMIN — OXYCODONE AND ACETAMINOPHEN 1 TABLET: 5; 325 TABLET ORAL at 00:58

## 2021-01-04 RX ADMIN — ENOXAPARIN SODIUM 40 MG: 40 INJECTION SUBCUTANEOUS at 12:33

## 2021-01-04 RX ADMIN — OXYCODONE AND ACETAMINOPHEN 1 TABLET: 5; 325 TABLET ORAL at 08:25

## 2021-01-04 RX ADMIN — OXYCODONE AND ACETAMINOPHEN 1 TABLET: 5; 325 TABLET ORAL at 12:41

## 2021-01-04 RX ADMIN — PANTOPRAZOLE SODIUM 40 MG: 40 TABLET, DELAYED RELEASE ORAL at 05:52

## 2021-01-04 RX ADMIN — OXYCODONE AND ACETAMINOPHEN 1 TABLET: 5; 325 TABLET ORAL at 19:41

## 2021-01-04 RX ADMIN — ARFORMOTEROL TARTRATE 15 MCG: 15 SOLUTION RESPIRATORY (INHALATION) at 20:55

## 2021-01-04 RX ADMIN — POTASSIUM CHLORIDE AND SODIUM CHLORIDE: 900; 150 INJECTION, SOLUTION INTRAVENOUS at 08:21

## 2021-01-04 RX ADMIN — BUDESONIDE 500 MCG: 0.5 SUSPENSION RESPIRATORY (INHALATION) at 20:55

## 2021-01-04 ASSESSMENT — PAIN DESCRIPTION - PROGRESSION: CLINICAL_PROGRESSION: NOT CHANGED

## 2021-01-04 ASSESSMENT — PAIN SCALES - GENERAL
PAINLEVEL_OUTOF10: 9
PAINLEVEL_OUTOF10: 9
PAINLEVEL_OUTOF10: 8

## 2021-01-04 ASSESSMENT — PAIN DESCRIPTION - FREQUENCY: FREQUENCY: CONTINUOUS

## 2021-01-04 ASSESSMENT — PAIN DESCRIPTION - ORIENTATION: ORIENTATION: LEFT;RIGHT

## 2021-01-04 ASSESSMENT — PAIN DESCRIPTION - PAIN TYPE: TYPE: ACUTE PAIN

## 2021-01-04 ASSESSMENT — PAIN DESCRIPTION - LOCATION: LOCATION: LEG

## 2021-01-04 NOTE — PROGRESS NOTES
Consult to Pain management for BLE pain,  rhabdomylosis called by PHYLLIS Mares.   Brandan Bailey  1/4/2021  9:30 AM  Recalled above consult Brandan Bailey  1/4/2021  9:41 AM

## 2021-01-04 NOTE — PLAN OF CARE
Problem: Pain:  Goal: Pain level will decrease  Description: Pain level will decrease  Outcome: Met This Shift  Goal: Control of acute pain  Description: Control of acute pain  Outcome: Met This Shift     Problem: Falls - Risk of:  Goal: Will remain free from falls  Description: Will remain free from falls  Outcome: Met This Shift  Goal: Absence of physical injury  Description: Absence of physical injury  Outcome: Met This Shift     Problem: Musculor/Skeletal Functional Status  Goal: Highest potential functional level  Outcome: Met This Shift

## 2021-01-04 NOTE — CARE COORDINATION
Met with patient about diagnosis and discharge plan of care. Pt lives in [place] and visiting here and staying with great niece. She is independent prior to admit. She uses no DME or home care services. Plan is return to [facility] and than home to [place]. She also lives with niece there. Pt feels she has no needs upon discharge. Will continue to follow.  Pain management consult pending-CAYLA

## 2021-01-04 NOTE — PROGRESS NOTES
Physical Therapy    Facility/Department: [address] ORTHO SURGERY  Treatment note    NAME: Ton Cortes  : 1970  MRN: [id number]    Date of Service: 2021      Patient Diagnosis(es): The encounter diagnosis was Non-traumatic rhabdomyolysis. Referring Provider: VALENTIN Serra     Evaluating PT:  Vanita Acevedo PT, DPT [id number]    Room #:  [address] [place], [place] [id number]-  Diagnosis:  Non-traumatic rhabdomyolysis  Precautions:  Fall risk  Equipment Needs:  w/w    SUBJECTIVE:    Pt lives in [place] in a bi-level home with 5 steps to enter with 1 rail. Pt ambulated with no device PTA. OBJECTIVE:   Initial Evaluation  Date:  Treatment  21 Short Term/ Long Term   Goals   Was pt agreeable to Eval/treatment? yes yes    Does pt have pain? Severe pain in bilateral LE with left being greater then right. Distal B LE gastroc pain, L > R    Bed Mobility  Rolling: independent  Supine to sit: independent  Sit to supine: NT  Scooting: independent Rolling: Independent  Supine to sit: Independent  Sit to supine: Independent independent   Transfers Sit to stand: supervision  Stand to sit: supervision  Stand pivot: NT Sit to stand: Mod A  Stand to sit: Mod A  Stand Pivot: Pt unable due to pain independent   Ambulation   Pt unable due to pain. Pt unable due to pain 150+ feet with AAD of needed modified independent    Stair negotiation: ascended and descended  NT NT 4 steps with 1 rail modified independent   ROM BLE:  WFL     Strength BLE:  Grossly 4/5     Balance Sitting EOB:  Independent  Static standing with w/w SBA  Sitting EOB:  independent  Dynamic Standing:  Modified independent   AM-PAC 6 Clicks  48/09        Pt is alert and able to follow instruction  Balance: poor standing using standard walker for support    Pt performed therapeutic exercise of the following: seated B LE approximation knee through heel, supine B gastroc stretch using a sheet to pull on.  All exercise performed to promote neutral ankle positioning needed to stand flat footed. Pt unable to touch heels to the floor, displays approx minus 10 to 15 degrees dorsiflexion with seated approximation. Supine gastroc stretch with sheet performed 10 seconds x 5 B LEs. Pt instructed to perform this using the sheet to tolerance. Patient education  Pt was educated on exercise promoting stretch needed to stand flat footed. Patient response to education:   Pt verbalized understanding Pt demonstrated skill Pt requires further education in this area   yes With repeated instruction yes     ASSESSMENT:   Comments: Pt found in bed, agreed to rx, assisted self to EOB, approximation knee through heel performed EOB. Rocking performed EOB to promote WB and stretch. Pt then stood off the EOB using SW for support approx 30 seconds x 2 with Mod A for balance, states this very difficult, states hurts a lot but it is a good hurt. Pt became emotional and was very happy she stood today. Pt then assisted to supine, stretching then performed. Pt was left in bed with call light in reach. Chair/bed alarm: bed alarm active    Time in 1315   Time out 1342   Total Treatment Time 27 minutes   CPT codes:     Therapeutic activities 26901 10 minutes   Therapeutic exercises 78307 17 minutes       Pt is making fair progress toward established Physical Therapy goals as per ability to stand this session. Continue with physical therapy current plan of care.     [facility] in [place]   License Number: [id number]

## 2021-01-04 NOTE — PROGRESS NOTES
Occupational Therapy  OT BEDSIDE TREATMENT NOTE      Date:2021  Patient Name: Ethan Dillard  MRN: [id number]  : 1970  Room: [address] [place] [address]     Per OT Eval:      Referring Provider: LENNY Manzo    Evaluating OT: Justin Hoover OTR/L [id number]    AM-PAC Daily Activity Raw Score: 17/24    Recommended Adaptive Equipment:  TBD     Diagnosis: Rhabdomyolysis    Pertinent Medical History: PTSD, bipolar, anxiety/depression   Precautions: falls    Home Living: Pt lives in [place] with her son. Here visiting   Staying in 1 story house with 5 steps to enter. Independent, occasionally uses quad cane. Drives      Pain Level: B LE pain ;   Cognition: Patient alert and grossly oriented. Impaired safety awareness and impulsivity demonstrated consistently throughout this session. Functional Assessment:    Initial Eval Status  Date: 20 Treatment Status  Date: 2021 STGs = LTGs  Time frame: 5-7 days   Feeding Independent        Grooming Set-up, seated  Decrease standing tolerance, LE pain    Mod I    UB Dressing Set-up    Mod I    LB Dressing Mod A    Mod I    Bathing         Toileting Assist with through hygiene  Min A with BSC transfer; Setup for hygiene while seated on BSC. Impulsivity demonstrated.  Independent    Bed Mobility  Supervision   Sit-supine  Supine-to-Sit: Supervision  Sit-to-Supine: Supervision      Functional Transfers SBA  BSC to bed (low squat pivot) patient refusing to stand , c/o increase LE pain      Able to scoot self to [facility]  Min A with stand-pivot transfers between EOB and BSC. Patient declined use of walker and was unable to achieve full, upright standing posture. Impulsivity demonstrated. Mod I    Functional Mobility Returned to bed  Not assessed.   Patient reported that she is unable to walk.  Mod I with good tolerance    Balance Sitting:     Static:  Independent   Standing: declined  Sitting: Good  (at EOB)  Standing: Fair-  (without device)  Independent   with good tolerance Activity Tolerance Fair - with light activity   Pain limiting tolerance   Patient also self limiting Limited secondary to LE pain.  Good with ADL activity      Comments: Upon arrival, patient supine in bed. At end of session, patient supine in bed (per patient preference) with call light and phone within reach and all lines and tubes intact; nursing aware of patient's request for IV site to be checked. Treatment: OT treatment provided this date included:    Instruction/training on safety and adapted techniques for completion of ADLs.   Instruction/training on safe functional mobility/transfer techniques.   Instruction/training on energy conservation/work simplification for completion of ADLs. Further skilled OT treatment indicated to increase patient's safety and independence with completion of ADL/IADL tasks in order to maximize patient's functional independence and quality of life. Education: Patient education provided regardin) orientation, 2) importance of having staff assistance with ADLs and other OOB activities to prevent falls/injury during hospitalization, 3) potential benefits of continued therapy services upon discharge. Patient demonstrated [address] understanding. Plan of Care: 1-3 days/week for 1-2 weeks PRN   [x]? ?ADL retraining/adapted techniques and AE recommendations to increase functional independence within precautions                    [x]? ? Energy conservation techniques to improve tolerance for selfcare routine   [x]? ? Functional transfer/mobility training/DME recommendations for increased independence, safety and fall prevention         [x]? ?Patient/family education to increase safety and functional independence             [x]? ? Environmental modifications for safe mobility and completion of ADLs                             []? ? Cognitive retraining ex's to improve problem solving skills & safe participation in ADLs/IADLs     []? ?Sensory re-education techniques to improve extremity awareness, maintain skin integrity and improve hand function                             []? ? Visual/Perceptual retraining  to improve body awareness and safety during transfers and ADLs  []? ? Splinting/positioning needs to maintain joint/skin integrity and prevent contractures  [x]? ? Therapeutic activity to improve functional performance during ADLs.                                         [x]? ? Therapeutic exercise to improve tolerance and functional strength for ADLs   [x]? ? Balance retraining/tolerance tasks for facilitation of postural control with dynamic challenges during ADLs .  []?? Neuromuscular re-education: facilitation of righting/equilibrium reactions, midline orientation, scapular stability/mobility, Normalization muscle     tone and facilitation active functional movement/Attention                         []? ? Delirium prevention/treatment    [x]? Positioning to improve functional independence  []? ? Other:     · Patient has made Fair progress towards set goals. · Continue OT plan of care. Time In: 1625  Time Out: 1645  Total Treatment Time: 20 minutes      Minutes Units   Therapeutic Ex 91234     Therapeutic Activities 37532     ADL/Self Care 38499 20 1   Orthotic Management 17750     Neuro Re-Ed 13711     Non-Billable Time N/A ---     Olga Murphy, OTR/L  License Number: [id number]

## 2021-01-04 NOTE — PROGRESS NOTES
Carlos  Hospitalist   Progress Note    Admitting Date and Time: 12/31/2020  5:20 AM  Admit Dx: Rhabdomyolysis [M62.82]     Seen for follow on rhabdo    Subjective: With Improving CKs , but significant BLE pain and difficult ambulation. Pain management  consulted . Denies CP ,sob or abd pain. D/w nursing - defer to  , might need disch planning. ROS: denies fever, chills, cp, sob, n/v, HA unless stated above.      sodium chloride flush  10 mL Intravenous 2 times per day    enoxaparin  40 mg Subcutaneous Daily    pantoprazole  40 mg Oral QAM AC    Arformoterol Tartrate  15 mcg Nebulization BID    budesonide  0.5 mg Nebulization BID         oxyCODONE-acetaminophen, 1 tablet, Q4H PRN      sodium chloride flush, 10 mL, PRN      promethazine, 12.5 mg, Q6H PRN    Or      ondansetron, 4 mg, Q6H PRN      polyethylene glycol, 17 g, Daily PRN      acetaminophen, 650 mg, Q6H PRN    Or      acetaminophen, 650 mg, Q6H PRN      morphine, 2 mg, Q3H PRN      albuterol, 1 ampule, Q6H PRN         Objective:    /62   Pulse 102   Temp 98.9 °F (37.2 °C) (Oral)   Resp 16   Ht 5' 11\" (1.803 m)   Wt 250 lb (113.4 kg)   SpO2 97%   BMI 34.87 kg/m²   General Appearance: alert and oriented to person, place and time,  in no acute distress  Skin: warm and dry  Head: normocephalic and atraumatic  Eyes: pupils equal, round, and reactive to light, extraocular eye movements intact, conjunctivae normal  Neck: supple and non-tender without mass  Pulmonary/Chest: clear to auscultation bilaterally  Cardiovascular: normal rate, regular rhythm, normal S1 and S2  Abdomen: soft, non-tender, non-distended, normal bowel sounds, no masses or organomegaly  Extremities: no cyanosis, clubbing, with bipedal edema & local tenderness  Musculoskeletal: normal range of motion  Neurologic:  no cranial nerve deficit,speech normal      Recent Labs     01/02/21  0400 01/03/21  0930 01/04/21  0322    132 135   K 4.2 4.2 3.8    103 105   CO2 21* 22 24   BUN 8 5* 7   CREATININE 0.7 0.7 0.7   GLUCOSE 107* 107* 101*   CALCIUM 8.2* 7.9* 7.9*       Recent Labs     01/02/21  0400 01/03/21  0930   WBC 14.0* 10.7   RBC 4.69 4.16   HGB 13.9 12.7   HCT 41.8 38.0   MCV 89.1 91.3   MCH 29.6 30.5   MCHC 33.3 33.4   RDW 13.6 13.9    145   MPV 10.7 11.5       Labs and images reviewed     Radiology:   CTA CHEST W CONTRAST   Final Result   No evidence of pulmonary embolism or acute pulmonary abnormality. 1.4 cm left adrenal nodule, likely adenoma. US DUP LOWER EXTREMITIES BILATERAL VENOUS   Final Result   No evidence of DVT in either lower extremity. XR CHEST PORTABLE   Final Result   No acute cardiopulmonary disease. Assessment:    Active Problems:    Rhabdomyolysis  Resolved Problems:    * No resolved hospital problems. *      Plan:  Acute rhabdomyolysis-possibly related to Zyprexa, continue IV fluids and other supportive treatment. Hold Zyprexa. Monitor Cks. Pain management. PT/OT/ following on discharge planning. AM-PAC score 19/24   Continue to monitor. Noted CK down trending. D/w charge nurse to follow on disch planning.     Bipedal edema with pain and tenderness-likely secondary to above , ultrasound negative for DVT, D-dimer elevated , CTA chest neg. As above pain management is consulted. Anxiety/depression/PTSD/bipolar-follows with psych in [place]. She has been on multiple meds and had side effects and needed to stop. As above we will hold Zyprexa and at present she does not want to try any new meds.     Asthma-continue as per home  IBS with chronic diarrhea  Leukocytosis-resolved, reactive, likely secondary to volume contraction    On Lovenox for DVT prophylaxis  Full code          Electronically signed by Paco Powell MD on 1/4/2021 at 8:05 AM

## 2021-01-04 NOTE — PROGRESS NOTES
P Quality Flow/Interdisciplinary Rounds Progress Note        Quality Flow Rounds held on January 4, 2021    Disciplines Attending:  Bedside Nurse, ,  and Nursing Unit Leadership    Ton Cortes was admitted on 12/31/2020  5:20 AM    Anticipated Discharge Date:  Expected Discharge Date: 01/03/21    Disposition:    Natan Score:  Natan Scale Score: 19    Readmission Risk              Risk of Unplanned Readmission:        13           Discussed patient goal for the day, patient clinical progression, and barriers to discharge. The following Goal(s) of the Day/Commitment(s) have been identified: pain control.       Wilver Boston  January 4, 2021

## 2021-01-05 LAB
ANION GAP SERPL CALCULATED.3IONS-SCNC: 6 MMOL/L (ref 7–16)
BUN BLDV-MCNC: 7 MG/DL (ref 6–20)
CALCIUM SERPL-MCNC: 8.4 MG/DL (ref 8.6–10.2)
CHLORIDE BLD-SCNC: 105 MMOL/L (ref 98–107)
CO2: 24 MMOL/L (ref 22–29)
CREAT SERPL-MCNC: 0.6 MG/DL (ref 0.5–1)
GFR AFRICAN AMERICAN: >60
GFR NON-AFRICAN AMERICAN: >60 ML/MIN/1.73
GLUCOSE BLD-MCNC: 96 MG/DL (ref 74–99)
POTASSIUM SERPL-SCNC: 4.1 MMOL/L (ref 3.5–5)
SODIUM BLD-SCNC: 135 MMOL/L (ref 132–146)
TOTAL CK: 5107 U/L (ref 20–180)

## 2021-01-05 PROCEDURE — 97535 SELF CARE MNGMENT TRAINING: CPT

## 2021-01-05 PROCEDURE — 1200000000 HC SEMI PRIVATE

## 2021-01-05 PROCEDURE — 36415 COLL VENOUS BLD VENIPUNCTURE: CPT

## 2021-01-05 PROCEDURE — 82550 ASSAY OF CK (CPK): CPT

## 2021-01-05 PROCEDURE — 6370000000 HC RX 637 (ALT 250 FOR IP): Performed by: INTERNAL MEDICINE

## 2021-01-05 PROCEDURE — 6360000002 HC RX W HCPCS: Performed by: NURSE PRACTITIONER

## 2021-01-05 PROCEDURE — 99232 SBSQ HOSP IP/OBS MODERATE 35: CPT | Performed by: INTERNAL MEDICINE

## 2021-01-05 PROCEDURE — 80048 BASIC METABOLIC PNL TOTAL CA: CPT

## 2021-01-05 PROCEDURE — 6370000000 HC RX 637 (ALT 250 FOR IP): Performed by: NURSE PRACTITIONER

## 2021-01-05 PROCEDURE — 94640 AIRWAY INHALATION TREATMENT: CPT

## 2021-01-05 PROCEDURE — 97530 THERAPEUTIC ACTIVITIES: CPT

## 2021-01-05 RX ADMIN — BUDESONIDE 500 MCG: 0.5 SUSPENSION RESPIRATORY (INHALATION) at 20:15

## 2021-01-05 RX ADMIN — ARFORMOTEROL TARTRATE 15 MCG: 15 SOLUTION RESPIRATORY (INHALATION) at 20:15

## 2021-01-05 RX ADMIN — BUDESONIDE 500 MCG: 0.5 SUSPENSION RESPIRATORY (INHALATION) at 10:34

## 2021-01-05 RX ADMIN — ENOXAPARIN SODIUM 40 MG: 40 INJECTION SUBCUTANEOUS at 13:21

## 2021-01-05 RX ADMIN — POTASSIUM CHLORIDE AND SODIUM CHLORIDE: 900; 150 INJECTION, SOLUTION INTRAVENOUS at 09:15

## 2021-01-05 RX ADMIN — ARFORMOTEROL TARTRATE 15 MCG: 15 SOLUTION RESPIRATORY (INHALATION) at 10:34

## 2021-01-05 RX ADMIN — PANTOPRAZOLE SODIUM 40 MG: 40 TABLET, DELAYED RELEASE ORAL at 06:39

## 2021-01-05 RX ADMIN — OXYCODONE AND ACETAMINOPHEN 1 TABLET: 5; 325 TABLET ORAL at 13:21

## 2021-01-05 RX ADMIN — OXYCODONE AND ACETAMINOPHEN 1 TABLET: 5; 325 TABLET ORAL at 00:22

## 2021-01-05 RX ADMIN — OXYCODONE AND ACETAMINOPHEN 1 TABLET: 5; 325 TABLET ORAL at 17:44

## 2021-01-05 RX ADMIN — OXYCODONE AND ACETAMINOPHEN 1 TABLET: 5; 325 TABLET ORAL at 09:15

## 2021-01-05 RX ADMIN — POTASSIUM CHLORIDE AND SODIUM CHLORIDE: 900; 150 INJECTION, SOLUTION INTRAVENOUS at 17:05

## 2021-01-05 ASSESSMENT — PAIN SCALES - GENERAL
PAINLEVEL_OUTOF10: 7
PAINLEVEL_OUTOF10: 10

## 2021-01-05 ASSESSMENT — PAIN DESCRIPTION - DESCRIPTORS: DESCRIPTORS: ACHING;DISCOMFORT

## 2021-01-05 ASSESSMENT — PAIN DESCRIPTION - PAIN TYPE
TYPE: CHRONIC PAIN
TYPE: ACUTE PAIN

## 2021-01-05 ASSESSMENT — PAIN DESCRIPTION - LOCATION
LOCATION: LEG
LOCATION: LEG

## 2021-01-05 ASSESSMENT — PAIN - FUNCTIONAL ASSESSMENT: PAIN_FUNCTIONAL_ASSESSMENT: PREVENTS OR INTERFERES SOME ACTIVE ACTIVITIES AND ADLS

## 2021-01-05 ASSESSMENT — PAIN DESCRIPTION - PROGRESSION: CLINICAL_PROGRESSION: NOT CHANGED

## 2021-01-05 NOTE — CONSULTS
Thank you for the referral               ccreferring physic             Electronically signed by LENNY Astorga CNP on 01/5/20 at 09:11 AM EST

## 2021-01-05 NOTE — PROGRESS NOTES
improve tolerance and functional strength for ADLs   [x]? ? ? Balance retraining/tolerance tasks for facilitation of postural control with dynamic challenges during ADLs .  []? ? ?Neuromuscular re-education: facilitation of righting/equilibrium reactions, midline orientation, scapular stability/mobility, Normalization muscle     tone and facilitation active functional movement/Attention                         []? ? ? Delirium prevention/treatment    [x]? ? Positioning to improve functional independence    Time In: 9:00  Time Out: 9:31     Min Units   Therapeutic Ex 90535     Therapeutic Activities 75806 73 1   ADL/Self Care 24491 10 1   Orthotic Management 41849     Neuro Re-Ed 19709     Non-Billable Time     TOTAL TIMED TREATMENT [address] [facility] VERN/L [id number]

## 2021-01-05 NOTE — PATIENT CARE CONFERENCE
P Quality Flow/Interdisciplinary Rounds Progress Note        Quality Flow Rounds held on January 5, 2021    Disciplines Attending:  Bedside Nurse, ,  and Nursing Unit Leadership    Kevin Hilario was admitted on 12/31/2020  5:20 AM    Anticipated Discharge Date:  Expected Discharge Date: 01/03/21    Disposition:    Natan Score:  Natan Scale Score: 19    Readmission Risk              Risk of Unplanned Readmission:        14           Discussed patient goal for the day, patient clinical progression, and barriers to discharge. The following Goal(s) of the Day/Commitment(s) have been identified:  Await resolution of Rhabdo. Pain control/safety.       Misti Braswell  January 5, 2021

## 2021-01-05 NOTE — CARE COORDINATION
Updated discharge plan of care. Continue to follow for discharge needs. Pt stated she is still planning on home.  No needs at this time-CAYLA

## 2021-01-05 NOTE — CARE COORDINATION
Social Work:    Per  Anne keyes. Patient is adamant of home plans and has no needs.     Electronically signed by KECIA Donohue on 1/5/2021 at 2:57 PM

## 2021-01-05 NOTE — PROGRESS NOTES
Carlos  Hospitalist   Progress Note    Admitting Date and Time: 12/31/2020  5:20 AM  Admit Dx: Rhabdomyolysis [M62.82]     Seen for follow on rhabdo    Subjective:  CKs downtrending, she is feeling much better, still continues with bipedal edema and bilateral lower extremity pain. Pain management consulted and following. Denies any chest pain, short of breath or abdominal pain. ROS: denies fever, chills, cp, sob, n/v, HA unless stated above.      sodium chloride flush  10 mL Intravenous 2 times per day    enoxaparin  40 mg Subcutaneous Daily    pantoprazole  40 mg Oral QAM AC    Arformoterol Tartrate  15 mcg Nebulization BID    budesonide  0.5 mg Nebulization BID         oxyCODONE-acetaminophen, 1 tablet, Q4H PRN      sodium chloride flush, 10 mL, PRN      promethazine, 12.5 mg, Q6H PRN    Or      ondansetron, 4 mg, Q6H PRN      polyethylene glycol, 17 g, Daily PRN      acetaminophen, 650 mg, Q6H PRN    Or      acetaminophen, 650 mg, Q6H PRN      morphine, 2 mg, Q3H PRN      albuterol, 1 ampule, Q6H PRN         Objective:    /65   Pulse 97   Temp 98.2 °F (36.8 °C) (Temporal)   Resp 16   Ht 5' 11\" (1.803 m)   Wt 250 lb (113.4 kg)   SpO2 94%   BMI 34.87 kg/m²   General Appearance: alert and oriented to person, place and time,  in no acute distress  Skin: warm and dry  Head: normocephalic and atraumatic  Eyes: pupils equal, round, and reactive to light, extraocular eye movements intact, conjunctivae normal  Neck: supple and non-tender without mass  Pulmonary/Chest: clear to auscultation bilaterally  Cardiovascular: normal rate, regular rhythm, normal S1 and S2  Abdomen: soft, non-tender, non-distended, normal bowel sounds, no masses or organomegaly  Extremities: no cyanosis, clubbing, with bipedal edema & local tenderness  Musculoskeletal: normal range of motion  Neurologic:  no cranial nerve deficit,speech normal      Recent Labs     01/03/21  0930 01/04/21  0322 01/05/21  0530    135 135   K 4.2 3.8 4.1    105 105   CO2 22 24 24   BUN 5* 7 7   CREATININE 0.7 0.7 0.6   GLUCOSE 107* 101* 96   CALCIUM 7.9* 7.9* 8.4*       Recent Labs     01/03/21  0930   WBC 10.7   RBC 4.16   HGB 12.7   HCT 38.0   MCV 91.3   MCH 30.5   MCHC 33.4   RDW 13.9      MPV 11.5       Labs and images reviewed     Radiology:   CTA CHEST W CONTRAST   Final Result   No evidence of pulmonary embolism or acute pulmonary abnormality. 1.4 cm left adrenal nodule, likely adenoma. US DUP LOWER EXTREMITIES BILATERAL VENOUS   Final Result   No evidence of DVT in either lower extremity. XR CHEST PORTABLE   Final Result   No acute cardiopulmonary disease. Assessment:    Active Problems:    Rhabdomyolysis  Resolved Problems:    * No resolved hospital problems. *      Plan:  Acute rhabdomyolysis-possibly related to Zyprexa, continue IV fluids and other supportive treatment. Zyprexa dc'd. .  With poor control of pain and effective ambulation problems - pain management is consulted. .  CK down trending.     Bipedal edema with pain and tenderness-likely secondary to above , ultrasound negative for DVT, D-dimer elevated , CTA chest neg. As above pain management is consulted. Anxiety/depression/PTSD/bipolar-follows with psych in [place]. She has been on multiple meds and had side effects and needed to stop. As above we will dc Zyprexa and at present she does not want to try any new meds.     Asthma-continue as per home  IBS with chronic diarrhea  Leukocytosis-resolved, reactive, likely secondary to volume contraction    On Lovenox for DVT prophylaxis  Full code          Electronically signed by Marcie Serna MD on 1/5/2021 at 9:40 AM

## 2021-01-06 LAB
ANION GAP SERPL CALCULATED.3IONS-SCNC: 7 MMOL/L (ref 7–16)
BACTERIA: ABNORMAL /HPF
BILIRUBIN URINE: NEGATIVE
BLOOD, URINE: NEGATIVE
BUN BLDV-MCNC: 6 MG/DL (ref 6–20)
CALCIUM SERPL-MCNC: 8.7 MG/DL (ref 8.6–10.2)
CHLORIDE BLD-SCNC: 105 MMOL/L (ref 98–107)
CLARITY: ABNORMAL
CO2: 23 MMOL/L (ref 22–29)
COLOR: YELLOW
CREAT SERPL-MCNC: 0.6 MG/DL (ref 0.5–1)
GFR AFRICAN AMERICAN: >60
GFR NON-AFRICAN AMERICAN: >60 ML/MIN/1.73
GLUCOSE BLD-MCNC: 90 MG/DL (ref 74–99)
GLUCOSE URINE: NEGATIVE MG/DL
KETONES, URINE: NEGATIVE MG/DL
LEUKOCYTE ESTERASE, URINE: NEGATIVE
NITRITE, URINE: NEGATIVE
PH UA: 5.5 (ref 5–9)
POTASSIUM SERPL-SCNC: 4.2 MMOL/L (ref 3.5–5)
PROTEIN UA: NEGATIVE MG/DL
RBC UA: ABNORMAL /HPF (ref 0–2)
SODIUM BLD-SCNC: 135 MMOL/L (ref 132–146)
SPECIFIC GRAVITY UA: 1.02 (ref 1–1.03)
TOTAL CK: 3015 U/L (ref 20–180)
UROBILINOGEN, URINE: 0.2 E.U./DL
WBC UA: ABNORMAL /HPF (ref 0–5)

## 2021-01-06 PROCEDURE — 6360000002 HC RX W HCPCS: Performed by: NURSE PRACTITIONER

## 2021-01-06 PROCEDURE — 94640 AIRWAY INHALATION TREATMENT: CPT

## 2021-01-06 PROCEDURE — 99232 SBSQ HOSP IP/OBS MODERATE 35: CPT | Performed by: INTERNAL MEDICINE

## 2021-01-06 PROCEDURE — 1200000000 HC SEMI PRIVATE

## 2021-01-06 PROCEDURE — 94660 CPAP INITIATION&MGMT: CPT

## 2021-01-06 PROCEDURE — 6370000000 HC RX 637 (ALT 250 FOR IP): Performed by: INTERNAL MEDICINE

## 2021-01-06 PROCEDURE — 36415 COLL VENOUS BLD VENIPUNCTURE: CPT

## 2021-01-06 PROCEDURE — 82550 ASSAY OF CK (CPK): CPT

## 2021-01-06 PROCEDURE — 80048 BASIC METABOLIC PNL TOTAL CA: CPT

## 2021-01-06 PROCEDURE — 97110 THERAPEUTIC EXERCISES: CPT

## 2021-01-06 PROCEDURE — 81001 URINALYSIS AUTO W/SCOPE: CPT

## 2021-01-06 PROCEDURE — 97530 THERAPEUTIC ACTIVITIES: CPT

## 2021-01-06 PROCEDURE — 6370000000 HC RX 637 (ALT 250 FOR IP): Performed by: NURSE PRACTITIONER

## 2021-01-06 RX ADMIN — OXYCODONE AND ACETAMINOPHEN 1 TABLET: 5; 325 TABLET ORAL at 20:31

## 2021-01-06 RX ADMIN — OXYCODONE AND ACETAMINOPHEN 1 TABLET: 5; 325 TABLET ORAL at 13:12

## 2021-01-06 RX ADMIN — ARFORMOTEROL TARTRATE 15 MCG: 15 SOLUTION RESPIRATORY (INHALATION) at 08:42

## 2021-01-06 RX ADMIN — POTASSIUM CHLORIDE AND SODIUM CHLORIDE: 900; 150 INJECTION, SOLUTION INTRAVENOUS at 20:31

## 2021-01-06 RX ADMIN — ENOXAPARIN SODIUM 40 MG: 40 INJECTION SUBCUTANEOUS at 13:04

## 2021-01-06 RX ADMIN — OXYCODONE AND ACETAMINOPHEN 1 TABLET: 5; 325 TABLET ORAL at 00:33

## 2021-01-06 RX ADMIN — POTASSIUM CHLORIDE AND SODIUM CHLORIDE: 900; 150 INJECTION, SOLUTION INTRAVENOUS at 13:05

## 2021-01-06 RX ADMIN — POTASSIUM CHLORIDE AND SODIUM CHLORIDE: 900; 150 INJECTION, SOLUTION INTRAVENOUS at 00:33

## 2021-01-06 RX ADMIN — ARFORMOTEROL TARTRATE 15 MCG: 15 SOLUTION RESPIRATORY (INHALATION) at 21:30

## 2021-01-06 RX ADMIN — BUDESONIDE 500 MCG: 0.5 SUSPENSION RESPIRATORY (INHALATION) at 08:42

## 2021-01-06 RX ADMIN — OXYCODONE AND ACETAMINOPHEN 1 TABLET: 5; 325 TABLET ORAL at 08:39

## 2021-01-06 RX ADMIN — BUDESONIDE 500 MCG: 0.5 SUSPENSION RESPIRATORY (INHALATION) at 21:30

## 2021-01-06 RX ADMIN — PANTOPRAZOLE SODIUM 40 MG: 40 TABLET, DELAYED RELEASE ORAL at 06:25

## 2021-01-06 RX ADMIN — POTASSIUM CHLORIDE AND SODIUM CHLORIDE: 900; 150 INJECTION, SOLUTION INTRAVENOUS at 06:26

## 2021-01-06 ASSESSMENT — PAIN SCALES - GENERAL
PAINLEVEL_OUTOF10: 5
PAINLEVEL_OUTOF10: 7
PAINLEVEL_OUTOF10: 0
PAINLEVEL_OUTOF10: 7

## 2021-01-06 NOTE — PROGRESS NOTES
ankle positioning needed to stand flat footed. Pt unable to touch L heel to the floor, displays approx minus 10 degrees L LE dorsiflexion with seated approximation, able to place heel on floor R LE today. Seated gastroc stretch with sheet performed 10 seconds x 5 B LEs. Pt instructed to perform this using the sheet to tolerance. Patient education  Pt was educated on exercise promoting stretch needed to stand flat footed. Patient response to education:   Pt verbalized understanding Pt demonstrated skill Pt requires further education in this area   yes With repeated instruction yes     ASSESSMENT:   Comments: Pt found in bed, agreed to rx, assisted self to EOB, approximation knee through heel performed EOB. Pivot performed bed to chair using SW for support. Pt displayed B LE WB through forefoot, unable to touch B heels. LE movement rigid, Pt unsteady, required hands on assist for balance during pivot. Once in the chair, LE stretching performed. Pt was left in a bedside chair with call light in reach. Chair/bed alarm: NT    Time in 0758   Time out 0830   Total Treatment Time 32 minutes   CPT codes:     Therapeutic activities 03349 16 minutes   Therapeutic exercises 95044 17 minutes       Pt is making fair progress toward established Physical Therapy goals as per transfer performed. Continue with physical therapy current plan of care.     Hudson Salguero PTA   License Number: [id number]

## 2021-01-06 NOTE — PROGRESS NOTES
Date: 1/6/2021    Time: 4:58 PM    Patient Placed On BIPAP/CPAP/ Non-Invasive Ventilation? Yes    If no must comment. Facial area red/color change? No           If YES are Blister/Lesion present? No   If yes must notify nursing staff  BIPAP/CPAP skin barrier?   Yes    Skin barrier type:mepilexlite       Comments:        Glenda Segura

## 2021-01-06 NOTE — PROGRESS NOTES
Carlos  Hospitalist   Progress Note    Admitting Date and Time: 12/31/2020  5:20 AM  Admit Dx: Rhabdomyolysis [M62.82]     Seen for follow on rhabdo    Subjective:  CKs continues to downtrend, today 3015, continues with bipedal edema bilateral lower extremity pain. But today feels is improving-she participated well with physical therapy. At present sitting in chair-feeling overall better. Denies any chest pain, short of breath, nausea vomiting or abdominal pain. D/w nursing. ROS: denies fever, chills, cp, sob, n/v, HA unless stated above.      sodium chloride flush  10 mL Intravenous 2 times per day    enoxaparin  40 mg Subcutaneous Daily    pantoprazole  40 mg Oral QAM AC    Arformoterol Tartrate  15 mcg Nebulization BID    budesonide  0.5 mg Nebulization BID         oxyCODONE-acetaminophen, 1 tablet, Q4H PRN      sodium chloride flush, 10 mL, PRN      promethazine, 12.5 mg, Q6H PRN    Or      ondansetron, 4 mg, Q6H PRN      polyethylene glycol, 17 g, Daily PRN      acetaminophen, 650 mg, Q6H PRN    Or      acetaminophen, 650 mg, Q6H PRN      morphine, 2 mg, Q3H PRN      albuterol, 1 ampule, Q6H PRN         Objective:    /69   Pulse 84   Temp 98.6 °F (37 °C) (Oral)   Resp 16   Ht 5' 11\" (1.803 m)   Wt 250 lb (113.4 kg)   SpO2 95%   BMI 34.87 kg/m²   General Appearance: alert and oriented to person, place and time,  in no acute distress  Skin: warm and dry  Head: normocephalic and atraumatic  Eyes: pupils equal, round, and reactive to light, extraocular eye movements intact, conjunctivae normal  Neck: supple and non-tender without mass  Pulmonary/Chest: clear to auscultation bilaterally  Cardiovascular: normal rate, regular rhythm, normal S1 and S2  Abdomen: soft, non-tender, non-distended, normal bowel sounds, no masses or organomegaly  Extremities: no cyanosis, clubbing, with bipedal edema & local tenderness  Musculoskeletal: normal range of motion  Neurologic: no cranial nerve deficit,speech normal      Recent Labs     01/04/21  0322 01/05/21  0530 01/06/21  0520    135 135   K 3.8 4.1 4.2    105 105   CO2 24 24 23   BUN 7 7 6   CREATININE 0.7 0.6 0.6   GLUCOSE 101* 96 90   CALCIUM 7.9* 8.4* 8.7       Recent Labs     01/03/21  0930   WBC 10.7   RBC 4.16   HGB 12.7   HCT 38.0   MCV 91.3   MCH 30.5   MCHC 33.4   RDW 13.9      MPV 11.5       Labs and images reviewed     Radiology:   CTA CHEST W CONTRAST   Final Result   No evidence of pulmonary embolism or acute pulmonary abnormality. 1.4 cm left adrenal nodule, likely adenoma. US DUP LOWER EXTREMITIES BILATERAL VENOUS   Final Result   No evidence of DVT in either lower extremity. XR CHEST PORTABLE   Final Result   No acute cardiopulmonary disease. Assessment:    Active Problems:    Rhabdomyolysis  Resolved Problems:    * No resolved hospital problems. *      Plan:  Acute rhabdomyolysis-possibly related to Zyprexa, continue IV fluids and other supportive treatment. Zyprexa dc'd. .  With poor control of pain and effective ambulation problems - pain management is consulted & following . Derek Fischer CK down trending.     Bipedal edema with pain and tenderness-likely secondary to above , ultrasound negative for DVT, D-dimer elevated , CTA chest neg. As above pain management is consulted and following. Currently on Percocet every 4 as needed. Once pain improves will decrease frequency. Noted baclofen, gabapentin added. Continue PT/OT. Anxiety/depression/PTSD/bipolar-follows with psych in [place]. She has been on multiple meds and had side effects and needed to stop. As above we will dc Zyprexa and at present she does not want to try any new meds.     Asthma-continue as per home    IBS with chronic diarrhea    Leukocytosis-resolved, reactive, likely secondary to volume contraction    On Lovenox for DVT prophylaxis  Full code          Electronically signed by Shilpa Ramírez MD on 1/6/2021 at 8:02 AM

## 2021-01-07 LAB
ANION GAP SERPL CALCULATED.3IONS-SCNC: 6 MMOL/L (ref 7–16)
BUN BLDV-MCNC: 7 MG/DL (ref 6–20)
CALCIUM IONIZED: 1.25 MMOL/L (ref 1.15–1.33)
CALCIUM SERPL-MCNC: 9 MG/DL (ref 8.6–10.2)
CHLORIDE BLD-SCNC: 106 MMOL/L (ref 98–107)
CO2: 25 MMOL/L (ref 22–29)
CREAT SERPL-MCNC: 0.6 MG/DL (ref 0.5–1)
GFR AFRICAN AMERICAN: >60
GFR NON-AFRICAN AMERICAN: >60 ML/MIN/1.73
GLUCOSE BLD-MCNC: 105 MG/DL (ref 74–99)
MAGNESIUM: 2.1 MG/DL (ref 1.6–2.6)
POTASSIUM SERPL-SCNC: 4.2 MMOL/L (ref 3.5–5)
SODIUM BLD-SCNC: 137 MMOL/L (ref 132–146)
TOTAL CK: 1586 U/L (ref 20–180)

## 2021-01-07 PROCEDURE — 36415 COLL VENOUS BLD VENIPUNCTURE: CPT

## 2021-01-07 PROCEDURE — 82330 ASSAY OF CALCIUM: CPT

## 2021-01-07 PROCEDURE — 6370000000 HC RX 637 (ALT 250 FOR IP): Performed by: STUDENT IN AN ORGANIZED HEALTH CARE EDUCATION/TRAINING PROGRAM

## 2021-01-07 PROCEDURE — 6370000000 HC RX 637 (ALT 250 FOR IP): Performed by: INTERNAL MEDICINE

## 2021-01-07 PROCEDURE — 94640 AIRWAY INHALATION TREATMENT: CPT

## 2021-01-07 PROCEDURE — 6360000002 HC RX W HCPCS: Performed by: NURSE PRACTITIONER

## 2021-01-07 PROCEDURE — 83735 ASSAY OF MAGNESIUM: CPT

## 2021-01-07 PROCEDURE — 6370000000 HC RX 637 (ALT 250 FOR IP): Performed by: NURSE PRACTITIONER

## 2021-01-07 PROCEDURE — 99232 SBSQ HOSP IP/OBS MODERATE 35: CPT | Performed by: STUDENT IN AN ORGANIZED HEALTH CARE EDUCATION/TRAINING PROGRAM

## 2021-01-07 PROCEDURE — 97110 THERAPEUTIC EXERCISES: CPT

## 2021-01-07 PROCEDURE — 2580000003 HC RX 258: Performed by: STUDENT IN AN ORGANIZED HEALTH CARE EDUCATION/TRAINING PROGRAM

## 2021-01-07 PROCEDURE — 97530 THERAPEUTIC ACTIVITIES: CPT

## 2021-01-07 PROCEDURE — 82550 ASSAY OF CK (CPK): CPT

## 2021-01-07 PROCEDURE — 80048 BASIC METABOLIC PNL TOTAL CA: CPT

## 2021-01-07 PROCEDURE — 6360000002 HC RX W HCPCS: Performed by: STUDENT IN AN ORGANIZED HEALTH CARE EDUCATION/TRAINING PROGRAM

## 2021-01-07 PROCEDURE — 1200000000 HC SEMI PRIVATE

## 2021-01-07 RX ORDER — DIPHENHYDRAMINE HCL 25 MG
25 TABLET ORAL ONCE
Status: COMPLETED | OUTPATIENT
Start: 2021-01-07 | End: 2021-01-07

## 2021-01-07 RX ORDER — SODIUM CHLORIDE 9 MG/ML
INJECTION, SOLUTION INTRAVENOUS CONTINUOUS
Status: DISCONTINUED | OUTPATIENT
Start: 2021-01-07 | End: 2021-01-08 | Stop reason: HOSPADM

## 2021-01-07 RX ORDER — FUROSEMIDE 10 MG/ML
20 INJECTION INTRAMUSCULAR; INTRAVENOUS ONCE
Status: COMPLETED | OUTPATIENT
Start: 2021-01-07 | End: 2021-01-07

## 2021-01-07 RX ADMIN — FUROSEMIDE 20 MG: 10 INJECTION, SOLUTION INTRAMUSCULAR; INTRAVENOUS at 16:32

## 2021-01-07 RX ADMIN — OXYCODONE AND ACETAMINOPHEN 1 TABLET: 5; 325 TABLET ORAL at 13:57

## 2021-01-07 RX ADMIN — ENOXAPARIN SODIUM 40 MG: 40 INJECTION SUBCUTANEOUS at 14:02

## 2021-01-07 RX ADMIN — OXYCODONE AND ACETAMINOPHEN 1 TABLET: 5; 325 TABLET ORAL at 08:10

## 2021-01-07 RX ADMIN — SODIUM CHLORIDE: 9 INJECTION, SOLUTION INTRAVENOUS at 14:03

## 2021-01-07 RX ADMIN — ARFORMOTEROL TARTRATE 15 MCG: 15 SOLUTION RESPIRATORY (INHALATION) at 07:59

## 2021-01-07 RX ADMIN — BUDESONIDE 500 MCG: 0.5 SUSPENSION RESPIRATORY (INHALATION) at 20:25

## 2021-01-07 RX ADMIN — OXYCODONE AND ACETAMINOPHEN 1 TABLET: 5; 325 TABLET ORAL at 23:55

## 2021-01-07 RX ADMIN — SODIUM CHLORIDE: 9 INJECTION, SOLUTION INTRAVENOUS at 19:32

## 2021-01-07 RX ADMIN — DIPHENHYDRAMINE HCL 25 MG: 25 TABLET ORAL at 19:43

## 2021-01-07 RX ADMIN — PANTOPRAZOLE SODIUM 40 MG: 40 TABLET, DELAYED RELEASE ORAL at 07:28

## 2021-01-07 RX ADMIN — ARFORMOTEROL TARTRATE 15 MCG: 15 SOLUTION RESPIRATORY (INHALATION) at 20:25

## 2021-01-07 RX ADMIN — BUDESONIDE 500 MCG: 0.5 SUSPENSION RESPIRATORY (INHALATION) at 07:58

## 2021-01-07 RX ADMIN — OXYCODONE AND ACETAMINOPHEN 1 TABLET: 5; 325 TABLET ORAL at 01:50

## 2021-01-07 ASSESSMENT — PAIN DESCRIPTION - FREQUENCY: FREQUENCY: INTERMITTENT

## 2021-01-07 ASSESSMENT — PAIN DESCRIPTION - DESCRIPTORS
DESCRIPTORS: ACHING;DISCOMFORT;DULL
DESCRIPTORS: ACHING;DULL;TIGHTNESS

## 2021-01-07 ASSESSMENT — PAIN DESCRIPTION - PAIN TYPE
TYPE: ACUTE PAIN
TYPE: ACUTE PAIN

## 2021-01-07 ASSESSMENT — PAIN SCALES - GENERAL
PAINLEVEL_OUTOF10: 0
PAINLEVEL_OUTOF10: 7
PAINLEVEL_OUTOF10: 6
PAINLEVEL_OUTOF10: 0
PAINLEVEL_OUTOF10: 7
PAINLEVEL_OUTOF10: 6

## 2021-01-07 ASSESSMENT — PAIN DESCRIPTION - LOCATION
LOCATION: LEG
LOCATION: LEG

## 2021-01-07 ASSESSMENT — PAIN DESCRIPTION - ONSET: ONSET: ON-GOING

## 2021-01-07 ASSESSMENT — PAIN - FUNCTIONAL ASSESSMENT: PAIN_FUNCTIONAL_ASSESSMENT: PREVENTS OR INTERFERES SOME ACTIVE ACTIVITIES AND ADLS

## 2021-01-07 ASSESSMENT — PAIN DESCRIPTION - PROGRESSION: CLINICAL_PROGRESSION: NOT CHANGED

## 2021-01-07 NOTE — PROGRESS NOTES
Nutrition Assessment     Type and Reason for Visit: Initial, RD Nutrition Re-Screen/LOS(RD Re-Screen Negative)    Nutrition Assessment:  Pt assessed per LOS protocol. Chart reviewed. Pt currently eating ~75% of most meals w/ some sporadic intake at times (per d/w pt via phone) and w/ no other significant nutritional issues noted at this time. Will follow-up per policy. Please consult if RD needed.     Electronically signed by Surya Ruth RD, CARMELLA on 1/7/21 at 3:47 PM EST    Contact: ext [phone]

## 2021-01-07 NOTE — PROGRESS NOTES
Occupational Therapy  OT BEDSIDE TREATMENT NOTE      Date:2021  Patient Name: Jose Mccarty  MRN: [id number]  : 1970  Room: [address] [place] [address]     Referring LENNY Curran     Evaluating OT: Robe Pastrana OTR/L [id number]     AM-PAC Daily Activity Raw Score:      Recommended Adaptive Equipment:  TBD      Diagnosis: Rhabdomyolysis    Pertinent Medical History: PTSD, bipolar, anxiety/depression   Precautions: falls     Home Living: Pt lives in [place] with her sonTheresa Rodriguez visiting   Staying in 1 story house with 5 steps to enter.   Independent, occasionally uses quad cane.  Drives       Pain Level: No c/o pain  Cognition: Patient alert and grossly oriented. Poor safety awareness demoed.      Functional Assessment:    Initial Eval Status  Date: 20 Treatment Status 20    STGs = LTGs  Time frame: 5-7 days   Feeding Independent        Grooming Set-up, seated  Decrease standing tolerance, LE pain   Mod I    UB Dressing Set-up   Mod I    LB Dressing Mod A   Mod I    Bathing         Toileting Assist with through hygiene  See comments Independent    Bed Mobility  Supervision   Sit-supine       Functional Transfers SBA  BSC to bed (low squat pivot) patient refusing to stand , c/o increase LE pain      Able to scoot self to HOB  STS: Min A from low surface chair Mod I    Functional Mobility Returned to bed   Min A using sw from chair to [facility] Mod I with good tolerance    Balance Sitting:     Static:  Independent   Standing: declined  Sitting: independent  Standing: Min A  Independent   with good tolerance    Activity Tolerance Fair - with light activity   Pain limiting tolerance   Patient also self limiting Fair-  Good with ADL activity          B UE were wFL during tasks. Comments: Upon arrival pt was sitting in arm chair. At end of session pt was transferred to [facility] with all lines and tubes intact and call light within reach.      Treatment: Instructed pt on B UE AROM exercises in all planes (1x12 reps) requiring mod vc and demos for correct form. Exercises were performed to improve strength during ADLs. Pt initially declined use of AD for mobility but with encouragement and education on importance of walker ot was agreeable. Pt impulsive attempting to stand without therapist near by. Due to extended time required on [facility] pt was instructed to utilize call light to contact staff when finished with toileting. RN notified. Education: Safety during transfers and benefits of AD to improve independence with ADLs. · Pt has made good progress towards set goals. Plan of Care: 1-3 days/week for 1-2 weeks PRN   [x]????ADL retraining/adapted techniques and AE recommendations to increase functional independence within precautions                    [x]? ? ??Energy conservation techniques to improve tolerance for selfcare routine   [x]???? Functional transfer/mobility training/DME recommendations for increased independence, safety and fall prevention         [x]????Patient/family education to increase safety and functional independence             [x]? ? ??Environmental modifications for safe mobility and completion of ADLs                             []????Cognitive retraining ex's to improve problem solving skills & safe participation in ADLs/IADLs     []?? ??Sensory re-education techniques to improve extremity awareness, maintain skin integrity and improve hand function                             []?? ??Visual/Perceptual retraining  to improve body awareness and safety during transfers and ADLs  []????Splinting/positioning needs to maintain joint/skin integrity and prevent contractures  [x]?? ?? Therapeutic activity to improve functional performance during ADLs.                                         [x]? ? ?? Therapeutic exercise to improve tolerance and functional strength for ADLs   [x]????Balance retraining/tolerance tasks for facilitation of postural control with dynamic challenges during ADLs

## 2021-01-07 NOTE — PROGRESS NOTES
[facility] Progress Note    Admitting Date and Time: 12/31/2020  5:20 AM  Admit Dx: Rhabdomyolysis [M62.82]    Subjective:  Patient is being followed for Rhabdomyolysis [M62.82]   Pt feels she is very weak to even get out of bed but per physical therapy documentation her score is 19 - 16. Patient is asking for her options for rehab placement. Patient is also very apprehensive to get out of bed though she is capable. Per RN: Patient had some rash after Percocet, which was held. ROS: denies fever, chills, cp, sob, n/v, HA unless stated above.       sodium chloride flush  10 mL Intravenous 2 times per day    enoxaparin  40 mg Subcutaneous Daily    pantoprazole  40 mg Oral QAM AC    Arformoterol Tartrate  15 mcg Nebulization BID    budesonide  0.5 mg Nebulization BID         oxyCODONE-acetaminophen, 1 tablet, Q4H PRN      sodium chloride flush, 10 mL, PRN      promethazine, 12.5 mg, Q6H PRN    Or      ondansetron, 4 mg, Q6H PRN      polyethylene glycol, 17 g, Daily PRN      acetaminophen, 650 mg, Q6H PRN    Or      acetaminophen, 650 mg, Q6H PRN      morphine, 2 mg, Q3H PRN      albuterol, 1 ampule, Q6H PRN         Objective:    /60   Pulse 85   Temp 98.1 °F (36.7 °C) (Oral)   Resp 16   Ht 5' 11\" (1.803 m)   Wt 250 lb (113.4 kg)   SpO2 95%   BMI 34.87 kg/m²     General Appearance: alert and oriented to person, place and time and in no acute distress, obese  Skin: warm and dry  Head: normocephalic and atraumatic  Eyes: pupils equal, round, and reactive to light, extraocular eye movements intact, conjunctivae normal  Neck: neck supple and non tender without mass   Pulmonary/Chest: clear to auscultation bilaterally- no wheezes, rales or rhonchi, normal air movement, no respiratory distress  Cardiovascular: normal rate, normal S1 and S2 and no carotid bruits  Abdomen: soft, non-tender, non-distended, normal bowel sounds, no masses or organomegaly  Extremities: no Smith Ruby MD on 1/7/2021 at 9:47 AM

## 2021-01-07 NOTE — PROGRESS NOTES
Physical Therapy    Facility/Department: [address] ORTHO SURGERY  Treatment note    NAME: Adria Summers  : 1970  MRN: [id number]    Date of Service: 2021      Patient Diagnosis(es): The encounter diagnosis was Non-traumatic rhabdomyolysis. Referring Provider: VALENTIN Sullivan     Evaluating PT:  Joanne Spicer, PT, DPT [id number]    Room #:  6873/5938W  Diagnosis:  Non-traumatic rhabdomyolysis  Precautions:  Fall risk  Equipment Needs:  w/w    SUBJECTIVE:    Pt lives in [place] in a bi-level home with 5 steps to enter with 1 rail. Pt ambulated with no device PTA. OBJECTIVE:   Initial Evaluation  Date:  Treatment  21 Short Term/ Long Term   Goals   Was pt agreeable to Eval/treatment? yes yes    Does pt have pain? Severe pain in bilateral LE with left being greater then right. Distal B LE gastroc pain, L > R    Bed Mobility  Rolling: independent  Supine to sit: independent  Sit to supine: NT  Scooting: independent Rolling: Independent  Supine to sit: Independent  Sit to supine: NT independent   Transfers Sit to stand: supervision  Stand to sit: supervision  Stand pivot: NT Sit to stand: SBA  Stand to sit: SBA  Stand Pivot: Min A using WW for support. Scoot pivot bed to chair Supervision. See comments independent   Ambulation   Pt unable due to pain. + feet with AAD of needed modified independent    Stair negotiation: ascended and descended  NT NT 4 steps with 1 rail modified independent   ROM BLE:  WFL     Strength BLE:  Grossly 4/5     Balance Sitting EOB:  Independent  Static standing with w/w SBA  Sitting EOB:  independent  Dynamic Standing:  Modified independent   AM-PAC 6 Clicks 35/57 91/27        Pt is alert and able to follow instruction  Balance: poor standing using standard walker for support    Pt performed therapeutic exercise of the following: seated B LE approximation knee through heel, seated B gastroc stretch using a sheet to pull on with B LEs elevated on a stood.  All exercise performed to promote neutral ankle positioning needed to stand flat footed. Pt unable to touch L heel to the floor, displays approx minus 10 degrees L LE dorsiflexion with seated approximation, able to place heel on floor R LE today. Seated gastroc stretch with sheet performed 10 seconds x 5 B LEs. Pt instructed to perform this using the sheet to tolerance. Patient education  Pt was educated on exercise promoting stretch needed to stand flat footed. Patient response to education:   Pt verbalized understanding Pt demonstrated skill Pt requires further education in this area   yes With repeated instruction yes     ASSESSMENT:   Comments: Pt found in bed, agreed to rx, assisted self to EOB, approximation knee through heel performed EOB. Pt states needs to go to the bathroom, scoot pivot performed bed <> bedside commode with little effort. Sit to stand transfers performed x 3, Pt stood approx 30 seconds x 3 using SW for support, wt shift performed with promotion of gastroc stretch, Pt on B forefeet, unable to touch B heels. Pivot performed bed to chair using [employer] for support. Pt displayed B LE WB through forefoot, unable to touch B heels. LE movement remains rigid, Pt remains unsteady, required hands on assist for balance during pivot using [employer]. Once in the chair, LE stretching performed. Pt was left in a bedside chair with call light in reach, LEs elevated to promote hamstring/gastroc stretch. Pt states can feel the stretch in her posterior legs at rest. .    Chair/bed alarm: NT    Time in 0859   Time out 0924   Total Treatment Time 25 minutes   CPT codes:     Therapeutic activities 84380 14 minutes   Therapeutic exercises 04719 11 minutes       Pt is making fair progress toward established Physical Therapy goals as per transfer performed. Continue with physical therapy current plan of care.     Eun Perez PTA   License Number: [id number]

## 2021-01-08 VITALS
RESPIRATION RATE: 16 BRPM | WEIGHT: 250 LBS | BODY MASS INDEX: 35 KG/M2 | TEMPERATURE: 97.9 F | HEART RATE: 80 BPM | SYSTOLIC BLOOD PRESSURE: 120 MMHG | DIASTOLIC BLOOD PRESSURE: 62 MMHG | HEIGHT: 71 IN | OXYGEN SATURATION: 94 %

## 2021-01-08 LAB
ANION GAP SERPL CALCULATED.3IONS-SCNC: 6 MMOL/L (ref 7–16)
BUN BLDV-MCNC: 7 MG/DL (ref 6–20)
CALCIUM SERPL-MCNC: 8.8 MG/DL (ref 8.6–10.2)
CHLORIDE BLD-SCNC: 105 MMOL/L (ref 98–107)
CO2: 27 MMOL/L (ref 22–29)
CREAT SERPL-MCNC: 0.7 MG/DL (ref 0.5–1)
GFR AFRICAN AMERICAN: >60
GFR NON-AFRICAN AMERICAN: >60 ML/MIN/1.73
GLUCOSE BLD-MCNC: 94 MG/DL (ref 74–99)
POTASSIUM SERPL-SCNC: 3.7 MMOL/L (ref 3.5–5)
SODIUM BLD-SCNC: 138 MMOL/L (ref 132–146)
TOTAL CK: 928 U/L (ref 20–180)

## 2021-01-08 PROCEDURE — 97535 SELF CARE MNGMENT TRAINING: CPT

## 2021-01-08 PROCEDURE — 97530 THERAPEUTIC ACTIVITIES: CPT

## 2021-01-08 PROCEDURE — 80048 BASIC METABOLIC PNL TOTAL CA: CPT

## 2021-01-08 PROCEDURE — 6360000002 HC RX W HCPCS: Performed by: NURSE PRACTITIONER

## 2021-01-08 PROCEDURE — 99239 HOSP IP/OBS DSCHRG MGMT >30: CPT | Performed by: STUDENT IN AN ORGANIZED HEALTH CARE EDUCATION/TRAINING PROGRAM

## 2021-01-08 PROCEDURE — 82550 ASSAY OF CK (CPK): CPT

## 2021-01-08 PROCEDURE — 2580000003 HC RX 258: Performed by: STUDENT IN AN ORGANIZED HEALTH CARE EDUCATION/TRAINING PROGRAM

## 2021-01-08 PROCEDURE — 94640 AIRWAY INHALATION TREATMENT: CPT

## 2021-01-08 PROCEDURE — 6370000000 HC RX 637 (ALT 250 FOR IP): Performed by: NURSE PRACTITIONER

## 2021-01-08 PROCEDURE — 36415 COLL VENOUS BLD VENIPUNCTURE: CPT

## 2021-01-08 PROCEDURE — 6360000002 HC RX W HCPCS: Performed by: STUDENT IN AN ORGANIZED HEALTH CARE EDUCATION/TRAINING PROGRAM

## 2021-01-08 RX ORDER — FUROSEMIDE 10 MG/ML
20 INJECTION INTRAMUSCULAR; INTRAVENOUS ONCE
Status: COMPLETED | OUTPATIENT
Start: 2021-01-08 | End: 2021-01-08

## 2021-01-08 RX ADMIN — ARFORMOTEROL TARTRATE 15 MCG: 15 SOLUTION RESPIRATORY (INHALATION) at 09:01

## 2021-01-08 RX ADMIN — SODIUM CHLORIDE: 9 INJECTION, SOLUTION INTRAVENOUS at 14:56

## 2021-01-08 RX ADMIN — SODIUM CHLORIDE: 9 INJECTION, SOLUTION INTRAVENOUS at 07:40

## 2021-01-08 RX ADMIN — SODIUM CHLORIDE: 9 INJECTION, SOLUTION INTRAVENOUS at 04:28

## 2021-01-08 RX ADMIN — ENOXAPARIN SODIUM 40 MG: 40 INJECTION SUBCUTANEOUS at 11:35

## 2021-01-08 RX ADMIN — ONDANSETRON 4 MG: 2 INJECTION INTRAMUSCULAR; INTRAVENOUS at 09:45

## 2021-01-08 RX ADMIN — SODIUM CHLORIDE: 9 INJECTION, SOLUTION INTRAVENOUS at 11:37

## 2021-01-08 RX ADMIN — BUDESONIDE 500 MCG: 0.5 SUSPENSION RESPIRATORY (INHALATION) at 09:01

## 2021-01-08 RX ADMIN — PANTOPRAZOLE SODIUM 40 MG: 40 TABLET, DELAYED RELEASE ORAL at 07:21

## 2021-01-08 RX ADMIN — FUROSEMIDE 20 MG: 10 INJECTION, SOLUTION INTRAMUSCULAR; INTRAVENOUS at 14:51

## 2021-01-08 ASSESSMENT — PAIN SCALES - GENERAL: PAINLEVEL_OUTOF10: 0

## 2021-01-08 ASSESSMENT — PAIN DESCRIPTION - PROGRESSION: CLINICAL_PROGRESSION: NOT CHANGED

## 2021-01-08 NOTE — DISCHARGE INSTR - COC
Continuity of Care Form    Patient Name: Violeta Meza   :  1970  MRN:  [id number]    Admit date:  2020  Discharge date:  ***    Code Status Order: Full Code   Advance Directives:   Advance Care Flowsheet Documentation     Date/Time Healthcare Directive Type of Healthcare Directive Copy in [address] Agent's Name Healthcare Agent's Phone Number    [phone]  No, patient does not have an advance directive for healthcare treatment -- -- -- -- --          Admitting Physician:  Trish Daugherty MD  PCP: No primary care provider on file. Discharging Nurse: [facility] Unit/Room#: [facility]  Discharging Unit Phone Number: ***    Emergency Contact:   Extended Emergency Contact Information  Primary Emergency Contact: Neelam Jones Phone: [phone]  Relation: Niece/Nephew   needed? No    Past Surgical History:  No past surgical history on file. Immunization History: There is no immunization history on file for this patient.     Active Problems:  Patient Active Problem List   Diagnosis Code    Rhabdomyolysis M62.82       Isolation/Infection:   Isolation          No Isolation        Patient Infection Status     None to display          Nurse Assessment:  Last Vital Signs: /62   Pulse 80   Temp 97.9 °F (36.6 °C) (Oral)   Resp 16   Ht 5' 11\" (1.803 m)   Wt 250 lb (113.4 kg)   SpO2 93%   BMI 34.87 kg/m²     Last documented pain score (0-10 scale): Pain Level: 0  Last Weight:   Wt Readings from Last 1 Encounters:   21 250 lb (113.4 kg)     Mental Status:  oriented, alert, coherent, logical, thought processes intact and able to concentrate and follow conversation    IV Access:  - None    Nursing Mobility/ADLs:  Walking   Assisted  Transfer  Assisted  Bathing  Assisted  Dressing  Assisted  Toileting  Independent  Feeding  Assisted  Med [address] [place] [address] Delivery   whole    Wound Care Documentation and Therapy: Elimination:  Continence:   · Bowel: Yes  · Bladder: Yes  Urinary Catheter: None   Colostomy/Ileostomy/Ileal Conduit: NO       Date of Last BM: ***    Intake/Output Summary (Last 24 hours) at 1/8/2021 1318  Last data filed at 1/8/2021 0800  Gross per 24 hour   Intake 240 ml   Output 1400 ml   Net -1160 ml     I/O last 3 completed shifts:  In: -   Out: 1400 [Urine:1400]    Safety Concerns:     History of Falls (last 30 days) and At Risk for Falls    Impairments/Disabilities:      Vision    Nutrition Therapy:  Current Nutrition Therapy:   - Oral Diet:  General    Routes of Feeding: Oral  Liquids: No Restrictions  Daily Fluid Restriction: no  Last Modified Barium Swallow with Video (Video Swallowing Test): not done    Treatments at the Time of Hospital Discharge:   Respiratory Treatments: NONE  Oxygen Therapy:  is not on home oxygen therapy.   Ventilator:    - No ventilator support    Rehab Therapies: Physical Therapy and Occupational Therapy  Weight Bearing Status/Restrictions: No weight bearing restirctions  Other Medical Equipment (for information only, NOT a DME order):  walker, bedside commode and hospital bed  Other Treatments: ***    Patient's personal belongings (please select all that are sent with patient):  Glasses    RN SIGNATURE:  Electronically signed by Girish Carreno RN on 1/8/21 at 5:33 PM EST    CASE MANAGEMENT/SOCIAL WORK SECTION    Inpatient Status Date: ***    Readmission Risk Assessment Score:  Readmission Risk              Risk of Unplanned Readmission:        12           Discharging to Facility/ Agency   · Name: [address] [place] [address]   · Address:  · Phone: [phone]  · Fax:[phone]    Dialysis Facility (if applicable)   · Name:  · Address:  · Dialysis Schedule:  · Phone:  · Fax:    / signature: Electronically signed by KECIA Salinas on 1/8/2021 at 1:18 PM    PHYSICIAN SECTION    Prognosis: {Prognosis:[id number]}    Condition at Discharge: [facility] Patient Condition:[id number]}    Rehab Potential (if transferring to Rehab): {Prognosis:0843827187}    Recommended Labs or Other Treatments After Discharge: ***    Physician Certification: I certify the above information and transfer of Nick Darby  is necessary for the continuing treatment of the diagnosis listed and that she requires [place] for less 30 days.      Update Admission H&P: {CHP DME Changes in RXGID:[id number]}    PHYSICIAN SIGNATURE:  {Esignature:[id number]}

## 2021-01-08 NOTE — CARE COORDINATION
Social Work:    Notified PT of need of expedited evaluation while ambulating for insurance auth and transfer plans to rehab today.     Electronically signed by KECIA [place] on 1/8/2021 at 1:15 PM

## 2021-01-08 NOTE — PROGRESS NOTES
[facility] Progress Note    Admitting Date and Time: 12/31/2020  5:20 AM  Admit Dx: Rhabdomyolysis [M62.82]    Subjective:  Patient is being followed for Rhabdomyolysis [M62.82]   Pt feels she is very weak and reports some SOB will decrease the fluids and give diuretic. Per RN: Patient had some rash after Percocet, which was held. ROS: denies fever, chills, cp, sob, n/v, HA unless stated above.       furosemide  20 mg Intravenous Once    sodium chloride flush  10 mL Intravenous 2 times per day    enoxaparin  40 mg Subcutaneous Daily    pantoprazole  40 mg Oral QAM AC    Arformoterol Tartrate  15 mcg Nebulization BID    budesonide  0.5 mg Nebulization BID         oxyCODONE-acetaminophen, 1 tablet, Q4H PRN      sodium chloride flush, 10 mL, PRN      promethazine, 12.5 mg, Q6H PRN    Or      ondansetron, 4 mg, Q6H PRN      polyethylene glycol, 17 g, Daily PRN      acetaminophen, 650 mg, Q6H PRN    Or      acetaminophen, 650 mg, Q6H PRN      morphine, 2 mg, Q3H PRN      albuterol, 1 ampule, Q6H PRN         Objective:    /62   Pulse 80   Temp 97.9 °F (36.6 °C) (Oral)   Resp 16   Ht 5' 11\" (1.803 m)   Wt 250 lb (113.4 kg)   SpO2 93%   BMI 34.87 kg/m²     General Appearance: alert and oriented to person, place and time and in no acute distress, obese  Skin: warm and dry  Head: normocephalic and atraumatic  Eyes: pupils equal, round, and reactive to light, extraocular eye movements intact, conjunctivae normal  Neck: neck supple and non tender without mass   Pulmonary/Chest: clear to auscultation bilaterally- no wheezes, rales or rhonchi, diminished air movement bibasilar, no respiratory distress  Cardiovascular: normal rate, normal S1 and S2 and no carotid bruits  Abdomen: soft, non-tender, non-distended, normal bowel sounds, no masses or organomegaly  Extremities: no cyanosis, no clubbing and +1 edema  Neurologic: no cranial nerve deficit and speech normal        Recent Labs     01/06/21  0520 01/07/21  0345 01/08/21  0420    137 138   K 4.2 4.2 3.7    106 105   CO2 23 25 27   BUN 6 7 7   CREATININE 0.6 0.6 0.7   GLUCOSE 90 105* 94   CALCIUM 8.7 9.0 8.8       No results for input(s): WBC, RBC, HGB, HCT, MCV, MCH, MCHC, RDW, PLT, MPV in the last 72 hours. Micro:  No components found for: [facility])    Radiology:   No results found. Assessment:    Active Problems:    Rhabdomyolysis  Resolved Problems:    * No resolved hospital problems. *      Plan:  1. Acute rhabdomyolysis - possibly related to Zyprexa, continue IV fluids and other supportive treatment. Zyprexa dc'd. Pain management was consulted, though she is doing well with Physical therapy. CK down trending on Iv aggressive hydration and loop diuretics .     2. Bipedal edema with pain and tenderness-likely secondary to above , ultrasound negative for DVT, D-dimer elevated , CTA chest neg. Gave a dose of lasix      3. Anxiety/depression/PTSD/bipolar-follows with psych in [place]. Does not want to see psychiatry in the hospital. She has been on multiple meds and had side effects and needed to stop. As above we will dc Zyprexa and at present she does not want to try any new meds.     4. Asthma-continue as per home    5. IBS with chronic diarrhea    6. Leukocytosis-resolved, reactive, likely secondary to volume contraction    7. Pain medication seeking behavior-patient keeps on asking for Percocet despite having itching with administration. On Lovenox for DVT prophylaxis  Full code    Social - case management arranging acute rehab placement       NOTE: This report was transcribed using voice recognition software. Every effort was made to ensure accuracy; however, inadvertent computerized transcription errors may be present.   Electronically signed by Gilma Claros MD on 1/8/2021 at 2:38 PM

## 2021-01-08 NOTE — CARE COORDINATION
Updated discharge plan of care. Spoke with Dr Elie Mccullough, and she is ok with discharge.  Plan is d/c  PM-[facility]

## 2021-01-08 NOTE — CARE COORDINATION
Social Work:    Accepted at [facility].  arranging transport.  (joni, n-17, ambulette done)    Electronically signed by KECIA Salinas on 1/8/2021 at 3:35 PM

## 2021-01-08 NOTE — CARE COORDINATION
Updated discharge plan of care. Pt NOW feels she may want rehab. Referral called to [phone], need updated therapy notes.  Will follow-O     PT AWARE OF DISCHARGE TO [facility]  PM, STAFF NOTIFIED-O

## 2021-01-08 NOTE — PROGRESS NOTES
Occupational Therapy  OT BEDSIDE TREATMENT NOTE      Date:2021  Patient Name: Arjun Borrero  MRN: [id number]  : 1970  Room: [address] [place] [address]-A     Referring LENNY Jackson     Evaluating OT: Michael Chiu OTR/L [id number]     AM-PAC Daily Activity Raw Score:      Recommended Adaptive Equipment:  3:1 commode      Diagnosis: Rhabdomyolysis    Pertinent Medical History: PTSD, bipolar, anxiety/depression   Precautions: falls     Home Living: Pt lives in [place] with her son. Jennifer visiting   Staying in 1 story house with 5 steps to enter.   Independent, occasionally uses quad cane.  Drives       Pain Level: B LE when standing- Moderate  Cognition: Patient alert and grossly oriented with ability to follow multi- step commands     Functional Assessment:    Initial Eval Status  Date: 20 Treatment Status 20    STGs = LTGs  Time frame: 5-7 days   Feeding Independent        Grooming Set-up, seated  Decrease standing tolerance, LE pain  S/U seated    Mod I    UB Dressing Set-up  S/U to manage gown to improve activity tolerance.  Mod I    LB Dressing Mod A   Mod A  Educated pt on AE management to increase independence with LE ADLs. Mod I    Bathing         Toileting Assist with through hygiene  SBA for hygiene while seated twice during tx. Assistance will be required with clothing management during future toileting tasks Independent    Bed Mobility  Supervision   Sit-supine   Supine to sit: Sup     Functional Transfers SBA  BSC to bed (low squat pivot) patient refusing to stand , c/o increase LE pain      Able to scoot self to HOB  STS: Mod A from low surface chair  Scoot pivot: SBA from various surfaces in room.  Mod I    Functional Mobility Returned to bed    Mod I with good tolerance    Balance Sitting:     Static:  Independent   Standing: declined  Sitting: independent  Standing: Min A  Independent   with good tolerance    Activity Tolerance Fair - with light activity   Pain limiting tolerance Patient also self limiting Fair-  Good with ADL activity           B UE were WFL during tasks. Comments: Upon arrival pt as supine in bed. At end of session pt was transferred back to bed with alarm on, all lines and tubes intact and call light within reach. Treatment: Instructed pt on use of reacher/ sock aid to manage socks and pants. Pt threaded B LE through clothing using reacher. Instructed pt to weight shifting from R to L to manage clothing over hips for future dressing tasks but to poor dynamic standing balance exhibited. Issued reacher, sock aid, [place] [address] and LHS due to pt initially being D/C home. Pt is now potentially being D/C to SNF. If D/C home OT recommends 3:1 commode to prevent falls during ADLs due to weakness. SW notified. Pt c/o increased B LE pain when standing. Education: Safety during ADLs, AE management, DME and safe transfer training. · Pt has made good progress towards set goals. Plan of Care: 1-3 days/week for 1-2 weeks PRN   [x]??? ? ? ADL retraining/adapted techniques and AE recommendations to increase functional independence within precautions                    [x]??? ? ? Energy conservation techniques to improve tolerance for selfcare routine   [x]??? ? ? Functional transfer/mobility training/DME recommendations for increased independence, safety and fall prevention         [x]??? ? ? Patient/family education to increase safety and functional independence             [x]??? ? ? Environmental modifications for safe mobility and completion of ADLs                             []??? ?? Cognitive retraining ex's to improve problem solving skills & safe participation in ADLs/IADLs     []??? ? ?Sensory re-education techniques to improve extremity awareness, maintain skin integrity and improve hand function                             []??? ? ? Visual/Perceptual retraining  to improve body awareness and safety during transfers and ADLs  []??? ?? Splinting/positioning needs to maintain

## 2021-01-09 NOTE — DISCHARGE SUMMARY
[JWNFR:3435]      LABS:  Recent Labs     01/07/21  0345 01/08/21  0420    138   K 4.2 3.7    105   CO2 25 27   BUN 7 7   CREATININE 0.6 0.7   GLUCOSE 105* 94   CALCIUM 9.0 8.8       No results for input(s): WBC, RBC, HGB, HCT, MCV, MCH, MCHC, RDW, PLT, MPV in the last 72 hours. No results for input(s): POCGLU in the last 72 hours. Imaging:  Xr Chest Portable    Result Date: 12/31/2020  EXAMINATION: ONE XRAY VIEW OF THE CHEST 12/31/2020 6:25 am COMPARISON: None. HISTORY: ORDERING SYSTEM PROVIDED HISTORY: other TECHNOLOGIST PROVIDED HISTORY: Reason for exam:->other FINDINGS: Hypoinflated lungs. Lungs are clear. No pleural effusion or pneumothorax. Normal cardiomediastinal silhouette and pulmonary vascularity. No acute osseous abnormality. No acute cardiopulmonary disease. Cta Chest W Contrast    Result Date: 12/31/2020  EXAMINATION: CTA OF THE CHEST 12/31/2020 4:17 pm TECHNIQUE: CTA of the chest was performed after the administration of intravenous contrast.  Multiplanar reformatted images are provided for review. MIP images are provided for review. Dose modulation, iterative reconstruction, and/or weight based adjustment of the mA/kV was utilized to reduce the radiation dose to as low as reasonably achievable. COMPARISON: None. HISTORY: ORDERING SYSTEM PROVIDED HISTORY: Recent travel. lower leg swelling. TECHNOLOGIST PROVIDED HISTORY: Reason for exam:->Recent travel. lower leg swelling. FINDINGS: Pulmonary Arteries: Pulmonary arteries are adequately opacified for evaluation. No evidence of intraluminal filling defect to suggest pulmonary embolism. Main pulmonary artery is normal in caliber. Mediastinum: No evidence of mediastinal lymphadenopathy. The heart and pericardium demonstrate no acute abnormality. There is no acute abnormality of the thoracic aorta. Lungs/pleura: The lungs are without acute process. No focal consolidation or pulmonary edema.   No evidence of pleural effusion or pneumothorax. Upper Abdomen: Mild fatty liver, status post cholecystectomy. There is a 1.4 cm left adrenal nodule, likely adenoma. Soft Tissues/Bones: No acute bone or soft tissue abnormality. No evidence of pulmonary embolism or acute pulmonary abnormality. 1.4 cm left adrenal nodule, likely adenoma. Us Dup Lower Extremities Bilateral Venous    Result Date: 12/31/2020  EXAMINATION: DUPLEX VENOUS ULTRASOUND OF THE BILATERAL LOWER EXTREMITIES, 12/31/2020 8:07 am TECHNIQUE: Duplex ultrasound and Doppler images were obtained of the bilateral lower extremities COMPARISON: None. HISTORY: ORDERING SYSTEM PROVIDED HISTORY: leg swelling TECHNOLOGIST PROVIDED HISTORY: Reason for exam:->leg swelling What reading provider will be dictating this exam?->CRC FINDINGS: The visualized veins of the bilateral lower extremities are patent and free of echogenic thrombus. The veins are normally compressible and have normal phasic flow. No evidence of DVT in either lower extremity.       Patient Instructions:      Medication List      CONTINUE taking these medications    albuterol 0.63 MG/3ML nebulizer solution  Commonly known as: ACCUNEB     budesonide-formoterol 160-4.5 MCG/ACT Aero  Commonly known as: SYMBICORT     busPIRone 10 MG tablet  Commonly known as: BUSPAR     fluticasone-salmeterol 250-50 MCG/DOSE Aepb  Commonly known as: ADVAIR     hydrOXYzine 10 MG tablet  Commonly known as: ATARAX     traZODone 50 MG tablet  Commonly known as: DESYREL     triamcinolone 0.1 % ointment  Commonly known as: KENALOG        STOP taking these medications    OLANZapine 5 MG tablet  Commonly known as: ZYPREXA              Note that more than 30 minutes was spent in preparing discharge papers, discussing discharge with patient, medication review, etc.    Signed:  Electronically signed by Richie Martinez MD on 1/9/2021 at 7:20 AM

## 2021-02-03 ENCOUNTER — APPOINTMENT (RX ONLY)
Dept: URBAN - NONMETROPOLITAN AREA CLINIC 2 | Facility: CLINIC | Age: 51
Setting detail: DERMATOLOGY
End: 2021-02-03

## 2021-02-03 DIAGNOSIS — L20.89 OTHER ATOPIC DERMATITIS: ICD-10-CM

## 2021-02-03 PROCEDURE — ? TREATMENT REGIMEN

## 2021-02-03 PROCEDURE — 99214 OFFICE O/P EST MOD 30 MIN: CPT

## 2021-02-03 PROCEDURE — ? COUNSELING

## 2021-02-03 PROCEDURE — ? DUPIXENT MONITORING

## 2021-02-03 ASSESSMENT — LOCATION SIMPLE DESCRIPTION DERM
LOCATION SIMPLE: LEFT PRETIBIAL REGION
LOCATION SIMPLE: RIGHT LOWER BACK
LOCATION SIMPLE: ABDOMEN
LOCATION SIMPLE: RIGHT CHEEK
LOCATION SIMPLE: RIGHT PRETIBIAL REGION
LOCATION SIMPLE: RIGHT FOREARM
LOCATION SIMPLE: LEFT FOREARM

## 2021-02-03 ASSESSMENT — LOCATION ZONE DERM
LOCATION ZONE: FACE
LOCATION ZONE: ARM
LOCATION ZONE: LEG
LOCATION ZONE: TRUNK

## 2021-02-03 ASSESSMENT — LOCATION DETAILED DESCRIPTION DERM
LOCATION DETAILED: LEFT VENTRAL PROXIMAL FOREARM
LOCATION DETAILED: EPIGASTRIC SKIN
LOCATION DETAILED: RIGHT DISTAL PRETIBIAL REGION
LOCATION DETAILED: RIGHT INFERIOR MEDIAL MALAR CHEEK
LOCATION DETAILED: RIGHT SUPERIOR MEDIAL MIDBACK
LOCATION DETAILED: LEFT PROXIMAL PRETIBIAL REGION
LOCATION DETAILED: RIGHT VENTRAL DISTAL FOREARM

## 2021-02-03 NOTE — PROCEDURE: MIPS QUALITY
Quality 431: Preventive Care And Screening: Unhealthy Alcohol Use - Screening: Patient screened for unhealthy alcohol use using a single question and scores less than 2 times per year
Quality 226: Preventive Care And Screening: Tobacco Use: Screening And Cessation Intervention: Patient screened for tobacco use and is an ex/non-smoker
Quality 110: Preventive Care And Screening: Influenza Immunization: Influenza Immunization Administered during Influenza season
Quality 111:Pneumonia Vaccination Status For Older Adults: Pneumococcal Vaccination not Administered or Previously Received, Reason not Otherwise Specified
Detail Level: Detailed

## 2021-02-03 NOTE — PROCEDURE: TREATMENT REGIMEN
Detail Level: Zone
Samples Given: DerMend moisturizing anti- itch lotion was given to patient. Lot # [id number] exp 3/22
Plan: Patient was recommended to take Benadryl  for the itch

## 2021-03-12 ENCOUNTER — APPOINTMENT (RX ONLY)
Dept: URBAN - METROPOLITAN AREA CLINIC 5 | Facility: CLINIC | Age: 51
Setting detail: DERMATOLOGY
End: 2021-03-12

## 2021-03-12 RX ORDER — DUPILUMAB 300 MG/2ML
1 INJECTION, SOLUTION SUBCUTANEOUS
Qty: 1 | Refills: 5 | Status: ERX

## 2021-05-12 ENCOUNTER — APPOINTMENT (RX ONLY)
Dept: URBAN - NONMETROPOLITAN AREA CLINIC 2 | Facility: CLINIC | Age: 51
Setting detail: DERMATOLOGY
End: 2021-05-12

## 2021-05-12 DIAGNOSIS — L20.89 OTHER ATOPIC DERMATITIS: ICD-10-CM | Status: WELL CONTROLLED

## 2021-05-12 DIAGNOSIS — L29.89 OTHER PRURITUS: ICD-10-CM | Status: STABLE

## 2021-05-12 DIAGNOSIS — L29.8 OTHER PRURITUS: ICD-10-CM | Status: STABLE

## 2021-05-12 PROCEDURE — ? DUPIXENT MONITORING

## 2021-05-12 PROCEDURE — 99214 OFFICE O/P EST MOD 30 MIN: CPT

## 2021-05-12 PROCEDURE — ? COUNSELING

## 2021-05-12 PROCEDURE — ? PRESCRIPTION MEDICATION MANAGEMENT

## 2021-05-12 ASSESSMENT — LOCATION DETAILED DESCRIPTION DERM
LOCATION DETAILED: EPIGASTRIC SKIN
LOCATION DETAILED: RIGHT CENTRAL MALAR CHEEK
LOCATION DETAILED: RIGHT DISTAL PRETIBIAL REGION
LOCATION DETAILED: RIGHT VENTRAL DISTAL FOREARM
LOCATION DETAILED: LEFT VENTRAL PROXIMAL FOREARM
LOCATION DETAILED: LEFT PROXIMAL PRETIBIAL REGION
LOCATION DETAILED: RIGHT MEDIAL UPPER BACK
LOCATION DETAILED: SUPERIOR THORACIC SPINE

## 2021-05-12 ASSESSMENT — LOCATION ZONE DERM
LOCATION ZONE: TRUNK
LOCATION ZONE: ARM
LOCATION ZONE: LEG
LOCATION ZONE: FACE

## 2021-05-12 ASSESSMENT — LOCATION SIMPLE DESCRIPTION DERM
LOCATION SIMPLE: RIGHT FOREARM
LOCATION SIMPLE: UPPER BACK
LOCATION SIMPLE: LEFT FOREARM
LOCATION SIMPLE: RIGHT UPPER BACK
LOCATION SIMPLE: RIGHT PRETIBIAL REGION
LOCATION SIMPLE: ABDOMEN
LOCATION SIMPLE: LEFT PRETIBIAL REGION
LOCATION SIMPLE: RIGHT CHEEK

## 2021-05-12 NOTE — PROCEDURE: PRESCRIPTION MEDICATION MANAGEMENT
Continue Regimen: Dupixent 300mg SC Q 2 weeks
Render In Strict Bullet Format?: No
Detail Level: Zone

## 2021-10-13 ENCOUNTER — APPOINTMENT (RX ONLY)
Dept: URBAN - NONMETROPOLITAN AREA CLINIC 2 | Facility: CLINIC | Age: 51
Setting detail: DERMATOLOGY
End: 2021-10-13

## 2021-10-13 DIAGNOSIS — L20.89 OTHER ATOPIC DERMATITIS: ICD-10-CM | Status: STABLE

## 2021-10-13 PROCEDURE — 99213 OFFICE O/P EST LOW 20 MIN: CPT

## 2021-10-13 PROCEDURE — ? PRESCRIPTION MEDICATION MANAGEMENT

## 2021-10-13 PROCEDURE — ? COUNSELING

## 2021-10-13 RX ORDER — TRIAMCINOLONE ACETONIDE 1 MG/G
OINTMENT TOPICAL BID
Qty: 80 | Refills: 1 | Status: ERX

## 2021-10-13 ASSESSMENT — LOCATION DETAILED DESCRIPTION DERM
LOCATION DETAILED: LEFT VENTRAL PROXIMAL FOREARM
LOCATION DETAILED: EPIGASTRIC SKIN
LOCATION DETAILED: RIGHT DISTAL PRETIBIAL REGION
LOCATION DETAILED: SUPERIOR THORACIC SPINE
LOCATION DETAILED: RIGHT VENTRAL DISTAL FOREARM
LOCATION DETAILED: RIGHT CENTRAL MALAR CHEEK
LOCATION DETAILED: LEFT PROXIMAL PRETIBIAL REGION

## 2021-10-13 ASSESSMENT — LOCATION SIMPLE DESCRIPTION DERM
LOCATION SIMPLE: UPPER BACK
LOCATION SIMPLE: LEFT FOREARM
LOCATION SIMPLE: RIGHT PRETIBIAL REGION
LOCATION SIMPLE: RIGHT CHEEK
LOCATION SIMPLE: LEFT PRETIBIAL REGION
LOCATION SIMPLE: RIGHT FOREARM
LOCATION SIMPLE: ABDOMEN

## 2021-10-13 ASSESSMENT — LOCATION ZONE DERM
LOCATION ZONE: LEG
LOCATION ZONE: TRUNK
LOCATION ZONE: FACE
LOCATION ZONE: ARM

## 2021-10-13 NOTE — PROCEDURE: PRESCRIPTION MEDICATION MANAGEMENT
Plan: Patient wishes to stop Dupixent for the time being. \\nsamples given Eucerin cream repair moisturizer daily \\nDeferred RX for Triamcinolone cream
Render In Strict Bullet Format?: No
Detail Level: Zone

## 2024-01-03 ENCOUNTER — APPOINTMENT (RX ONLY)
Dept: URBAN - NONMETROPOLITAN AREA CLINIC 2 | Facility: CLINIC | Age: 54
Setting detail: DERMATOLOGY
End: 2024-01-03

## 2024-01-03 DIAGNOSIS — L72.0 EPIDERMAL CYST: ICD-10-CM

## 2024-01-03 PROCEDURE — ? ACNE SURGERY

## 2024-01-03 PROCEDURE — ? COUNSELING

## 2024-01-03 PROCEDURE — 10040 EXTRACTION: CPT

## 2024-01-03 ASSESSMENT — LOCATION ZONE DERM: LOCATION ZONE: SCALP

## 2024-01-03 ASSESSMENT — LOCATION SIMPLE DESCRIPTION DERM: LOCATION SIMPLE: SCALP

## 2024-01-03 ASSESSMENT — LOCATION DETAILED DESCRIPTION DERM: LOCATION DETAILED: LEFT INFERIOR POSTAURICULAR SKIN

## 2024-01-03 NOTE — PROCEDURE: ACNE SURGERY
Extraction Method: 11 blade and q-tip
Render Post-Care Instructions In Note?: no
Prep Text (Optional): Prior to removal the treatment areas were prepped in the usual fashion.
Detail Level: Simple
Render Number Of Lesions Treated: yes
Acne Type: Milial cysts
Consent was obtained and risks were reviewed including but not limited to scarring, infection, bleeding, scabbing, incomplete removal, and allergy to anesthesia.
Hemostasis: Pressure
Post-Care Instructions: I reviewed with the patient in detail post-care instructions. Patient is to keep the treatment areaas dry overnight, and then apply bacitracin twice daily until healed. Patient may apply hydrogen peroxide soaks to remove any crusting.